# Patient Record
Sex: MALE | Race: WHITE | NOT HISPANIC OR LATINO | Employment: OTHER | URBAN - METROPOLITAN AREA
[De-identification: names, ages, dates, MRNs, and addresses within clinical notes are randomized per-mention and may not be internally consistent; named-entity substitution may affect disease eponyms.]

---

## 2017-12-26 ENCOUNTER — APPOINTMENT (OUTPATIENT)
Dept: ADMISSIONS | Facility: MEDICAL CENTER | Age: 67
DRG: 330 | End: 2017-12-26
Attending: SURGERY
Payer: MEDICARE

## 2017-12-26 RX ORDER — LISINOPRIL 40 MG/1
40 TABLET ORAL DAILY
Status: ON HOLD | COMMUNITY
End: 2018-01-16

## 2017-12-26 RX ORDER — TRIAMTERENE AND HYDROCHLOROTHIAZIDE 37.5; 25 MG/1; MG/1
1 CAPSULE ORAL EVERY MORNING
Status: ON HOLD | COMMUNITY
End: 2019-02-08

## 2017-12-26 RX ORDER — CITALOPRAM 40 MG/1
40 TABLET ORAL DAILY
Status: ON HOLD | COMMUNITY
End: 2018-01-16

## 2018-01-08 DIAGNOSIS — Z01.812 PRE-OPERATIVE LABORATORY EXAMINATION: ICD-10-CM

## 2018-01-08 DIAGNOSIS — Z01.810 PRE-OPERATIVE CARDIOVASCULAR EXAMINATION: ICD-10-CM

## 2018-01-08 LAB
ANION GAP SERPL CALC-SCNC: 8 MMOL/L (ref 0–11.9)
BUN SERPL-MCNC: 28 MG/DL (ref 8–22)
CALCIUM SERPL-MCNC: 9.6 MG/DL (ref 8.5–10.5)
CHLORIDE SERPL-SCNC: 102 MMOL/L (ref 96–112)
CO2 SERPL-SCNC: 22 MMOL/L (ref 20–33)
CREAT SERPL-MCNC: 1.48 MG/DL (ref 0.5–1.4)
EKG IMPRESSION: NORMAL
ERYTHROCYTE [DISTWIDTH] IN BLOOD BY AUTOMATED COUNT: 48 FL (ref 35.9–50)
GLUCOSE SERPL-MCNC: 111 MG/DL (ref 65–99)
HCT VFR BLD AUTO: 46.4 % (ref 42–52)
HGB BLD-MCNC: 15.6 G/DL (ref 14–18)
MCH RBC QN AUTO: 30.3 PG (ref 27–33)
MCHC RBC AUTO-ENTMCNC: 33.6 G/DL (ref 33.7–35.3)
MCV RBC AUTO: 90.1 FL (ref 81.4–97.8)
PLATELET # BLD AUTO: 292 K/UL (ref 164–446)
PMV BLD AUTO: 8.6 FL (ref 9–12.9)
POTASSIUM SERPL-SCNC: 4.3 MMOL/L (ref 3.6–5.5)
RBC # BLD AUTO: 5.15 M/UL (ref 4.7–6.1)
SODIUM SERPL-SCNC: 132 MMOL/L (ref 135–145)
WBC # BLD AUTO: 7.6 K/UL (ref 4.8–10.8)

## 2018-01-08 PROCEDURE — 36415 COLL VENOUS BLD VENIPUNCTURE: CPT

## 2018-01-08 PROCEDURE — 93010 ELECTROCARDIOGRAM REPORT: CPT | Performed by: INTERNAL MEDICINE

## 2018-01-08 PROCEDURE — 93005 ELECTROCARDIOGRAM TRACING: CPT

## 2018-01-08 PROCEDURE — 85027 COMPLETE CBC AUTOMATED: CPT

## 2018-01-08 PROCEDURE — 80048 BASIC METABOLIC PNL TOTAL CA: CPT

## 2018-01-08 RX ORDER — CELECOXIB 200 MG/1
400 CAPSULE ORAL
Status: COMPLETED | OUTPATIENT
Start: 2018-01-09 | End: 2018-01-09

## 2018-01-08 RX ORDER — ACETAMINOPHEN 500 MG
1000 TABLET ORAL
Status: COMPLETED | OUTPATIENT
Start: 2018-01-09 | End: 2018-01-09

## 2018-01-08 RX ORDER — GABAPENTIN 300 MG/1
300 CAPSULE ORAL
Status: COMPLETED | OUTPATIENT
Start: 2018-01-09 | End: 2018-01-09

## 2018-01-09 ENCOUNTER — HOSPITAL ENCOUNTER (INPATIENT)
Facility: MEDICAL CENTER | Age: 68
LOS: 7 days | DRG: 330 | End: 2018-01-16
Attending: SURGERY | Admitting: SURGERY
Payer: MEDICARE

## 2018-01-09 DIAGNOSIS — K59.1 FUNCTIONAL DIARRHEA: ICD-10-CM

## 2018-01-09 DIAGNOSIS — R10.9 ACUTE POSTOPERATIVE PAIN OF ABDOMEN: ICD-10-CM

## 2018-01-09 DIAGNOSIS — G89.18 ACUTE POSTOPERATIVE PAIN OF ABDOMEN: ICD-10-CM

## 2018-01-09 LAB
GRAM STN SPEC: NORMAL
SIGNIFICANT IND 70042: NORMAL
SITE SITE: NORMAL
SOURCE SOURCE: NORMAL

## 2018-01-09 PROCEDURE — 501446: Performed by: SURGERY

## 2018-01-09 PROCEDURE — 160042 HCHG SURGERY MINUTES - EA ADDL 1 MIN LEVEL 5: Performed by: SURGERY

## 2018-01-09 PROCEDURE — 501428 HCHG STAPLER, CURVED: Performed by: SURGERY

## 2018-01-09 PROCEDURE — 501433 HCHG STAPLER, GIA MULTIFIRE 60/80: Performed by: SURGERY

## 2018-01-09 PROCEDURE — 0DTN4ZZ RESECTION OF SIGMOID COLON, PERCUTANEOUS ENDOSCOPIC APPROACH: ICD-10-PCS | Performed by: SURGERY

## 2018-01-09 PROCEDURE — C1765 ADHESION BARRIER: HCPCS | Performed by: SURGERY

## 2018-01-09 PROCEDURE — 0DBB4ZZ EXCISION OF ILEUM, PERCUTANEOUS ENDOSCOPIC APPROACH: ICD-10-PCS | Performed by: SURGERY

## 2018-01-09 PROCEDURE — 88305 TISSUE EXAM BY PATHOLOGIST: CPT

## 2018-01-09 PROCEDURE — 160048 HCHG OR STATISTICAL LEVEL 1-5: Performed by: SURGERY

## 2018-01-09 PROCEDURE — 0D1B4Z4 BYPASS ILEUM TO CUTANEOUS, PERCUTANEOUS ENDOSCOPIC APPROACH: ICD-10-PCS | Performed by: SURGERY

## 2018-01-09 PROCEDURE — 160036 HCHG PACU - EA ADDL 30 MINS PHASE I: Performed by: SURGERY

## 2018-01-09 PROCEDURE — 87186 SC STD MICRODIL/AGAR DIL: CPT

## 2018-01-09 PROCEDURE — 700111 HCHG RX REV CODE 636 W/ 250 OVERRIDE (IP): Performed by: ANESTHESIOLOGY

## 2018-01-09 PROCEDURE — 160009 HCHG ANES TIME/MIN: Performed by: SURGERY

## 2018-01-09 PROCEDURE — 88307 TISSUE EXAM BY PATHOLOGIST: CPT

## 2018-01-09 PROCEDURE — 500444 HCHG HEMOSTAT, SURGICEL 2X3: Performed by: SURGERY

## 2018-01-09 PROCEDURE — 0DTM4ZZ RESECTION OF DESCENDING COLON, PERCUTANEOUS ENDOSCOPIC APPROACH: ICD-10-PCS | Performed by: SURGERY

## 2018-01-09 PROCEDURE — A9270 NON-COVERED ITEM OR SERVICE: HCPCS | Performed by: NURSE PRACTITIONER

## 2018-01-09 PROCEDURE — 0DTU4ZZ RESECTION OF OMENTUM, PERCUTANEOUS ENDOSCOPIC APPROACH: ICD-10-PCS | Performed by: SURGERY

## 2018-01-09 PROCEDURE — 501570 HCHG TROCAR, SEPARATOR: Performed by: SURGERY

## 2018-01-09 PROCEDURE — 501838 HCHG SUTURE GENERAL: Performed by: SURGERY

## 2018-01-09 PROCEDURE — 700111 HCHG RX REV CODE 636 W/ 250 OVERRIDE (IP)

## 2018-01-09 PROCEDURE — 700102 HCHG RX REV CODE 250 W/ 637 OVERRIDE(OP)

## 2018-01-09 PROCEDURE — 87077 CULTURE AEROBIC IDENTIFY: CPT

## 2018-01-09 PROCEDURE — 0DTL4ZZ RESECTION OF TRANSVERSE COLON, PERCUTANEOUS ENDOSCOPIC APPROACH: ICD-10-PCS | Performed by: SURGERY

## 2018-01-09 PROCEDURE — 160035 HCHG PACU - 1ST 60 MINS PHASE I: Performed by: SURGERY

## 2018-01-09 PROCEDURE — 700102 HCHG RX REV CODE 250 W/ 637 OVERRIDE(OP): Performed by: SURGERY

## 2018-01-09 PROCEDURE — 700105 HCHG RX REV CODE 258: Performed by: SURGERY

## 2018-01-09 PROCEDURE — 500002 HCHG ADHESIVE, DERMABOND: Performed by: SURGERY

## 2018-01-09 PROCEDURE — A9270 NON-COVERED ITEM OR SERVICE: HCPCS | Performed by: SURGERY

## 2018-01-09 PROCEDURE — A9270 NON-COVERED ITEM OR SERVICE: HCPCS

## 2018-01-09 PROCEDURE — 0DTK4ZZ RESECTION OF ASCENDING COLON, PERCUTANEOUS ENDOSCOPIC APPROACH: ICD-10-PCS | Performed by: SURGERY

## 2018-01-09 PROCEDURE — 500122 HCHG BOVIE, BLADE: Performed by: SURGERY

## 2018-01-09 PROCEDURE — 501459: Performed by: SURGERY

## 2018-01-09 PROCEDURE — 500371 HCHG DRAIN, BLAKE 10MM: Performed by: SURGERY

## 2018-01-09 PROCEDURE — 501452 HCHG STAPLES, GIA MULTIFIRE 60/80: Performed by: SURGERY

## 2018-01-09 PROCEDURE — 501448 HCHG STAPLER, TA 45 4.8: Performed by: SURGERY

## 2018-01-09 PROCEDURE — P9045 ALBUMIN (HUMAN), 5%, 250 ML: HCPCS | Mod: JG

## 2018-01-09 PROCEDURE — 160002 HCHG RECOVERY MINUTES (STAT): Performed by: SURGERY

## 2018-01-09 PROCEDURE — 500594 HCHG GELPORT: Performed by: SURGERY

## 2018-01-09 PROCEDURE — 500389 HCHG DRAIN, RESERVOIR SUCT JP 100CC: Performed by: SURGERY

## 2018-01-09 PROCEDURE — 700101 HCHG RX REV CODE 250

## 2018-01-09 PROCEDURE — 700101 HCHG RX REV CODE 250: Performed by: NURSE PRACTITIONER

## 2018-01-09 PROCEDURE — 160031 HCHG SURGERY MINUTES - 1ST 30 MINS LEVEL 5: Performed by: SURGERY

## 2018-01-09 PROCEDURE — 8E0W4CZ ROBOTIC ASSISTED PROCEDURE OF TRUNK REGION, PERCUTANEOUS ENDOSCOPIC APPROACH: ICD-10-PCS | Performed by: SURGERY

## 2018-01-09 PROCEDURE — 502240 HCHG MISC OR SUPPLY RC 0272: Performed by: SURGERY

## 2018-01-09 PROCEDURE — 87075 CULTR BACTERIA EXCEPT BLOOD: CPT

## 2018-01-09 PROCEDURE — 87070 CULTURE OTHR SPECIMN AEROBIC: CPT

## 2018-01-09 PROCEDURE — 700111 HCHG RX REV CODE 636 W/ 250 OVERRIDE (IP): Performed by: NURSE PRACTITIONER

## 2018-01-09 PROCEDURE — 770006 HCHG ROOM/CARE - MED/SURG/GYN SEMI*

## 2018-01-09 PROCEDURE — A6402 STERILE GAUZE <= 16 SQ IN: HCPCS | Performed by: SURGERY

## 2018-01-09 PROCEDURE — 700102 HCHG RX REV CODE 250 W/ 637 OVERRIDE(OP): Performed by: NURSE PRACTITIONER

## 2018-01-09 PROCEDURE — A4314 CATH W/DRAINAGE 2-WAY LATEX: HCPCS | Performed by: SURGERY

## 2018-01-09 PROCEDURE — 700111 HCHG RX REV CODE 636 W/ 250 OVERRIDE (IP): Performed by: SURGERY

## 2018-01-09 PROCEDURE — 87205 SMEAR GRAM STAIN: CPT

## 2018-01-09 PROCEDURE — 700104 HCHG RX REV CODE 254

## 2018-01-09 RX ORDER — HYDROMORPHONE HYDROCHLORIDE 2 MG/ML
1 INJECTION, SOLUTION INTRAMUSCULAR; INTRAVENOUS; SUBCUTANEOUS
Status: DISCONTINUED | OUTPATIENT
Start: 2018-01-09 | End: 2018-01-16 | Stop reason: HOSPADM

## 2018-01-09 RX ORDER — IBUPROFEN 800 MG/1
800 TABLET ORAL
Status: COMPLETED | OUTPATIENT
Start: 2018-01-09 | End: 2018-01-14

## 2018-01-09 RX ORDER — CELECOXIB 200 MG/1
CAPSULE ORAL
Status: COMPLETED
Start: 2018-01-09 | End: 2018-01-09

## 2018-01-09 RX ORDER — CALCIUM CARBONATE 500 MG/1
500 TABLET, CHEWABLE ORAL
Status: DISCONTINUED | OUTPATIENT
Start: 2018-01-09 | End: 2018-01-16 | Stop reason: HOSPADM

## 2018-01-09 RX ORDER — ACETAMINOPHEN 500 MG
TABLET ORAL
Status: COMPLETED
Start: 2018-01-09 | End: 2018-01-09

## 2018-01-09 RX ORDER — OXYCODONE HYDROCHLORIDE 5 MG/1
5 TABLET ORAL EVERY 4 HOURS PRN
Status: DISCONTINUED | OUTPATIENT
Start: 2018-01-09 | End: 2018-01-16 | Stop reason: HOSPADM

## 2018-01-09 RX ORDER — MAGNESIUM SULFATE HEPTAHYDRATE 40 MG/ML
4 INJECTION, SOLUTION INTRAVENOUS ONCE
Status: DISPENSED | OUTPATIENT
Start: 2018-01-09 | End: 2018-01-10

## 2018-01-09 RX ORDER — GABAPENTIN 300 MG/1
CAPSULE ORAL
Status: COMPLETED
Start: 2018-01-09 | End: 2018-01-09

## 2018-01-09 RX ORDER — DEXAMETHASONE SODIUM PHOSPHATE 4 MG/ML
4 INJECTION, SOLUTION INTRA-ARTICULAR; INTRALESIONAL; INTRAMUSCULAR; INTRAVENOUS; SOFT TISSUE
Status: DISCONTINUED | OUTPATIENT
Start: 2018-01-09 | End: 2018-01-16 | Stop reason: HOSPADM

## 2018-01-09 RX ORDER — DIPHENHYDRAMINE HYDROCHLORIDE 50 MG/ML
25 INJECTION INTRAMUSCULAR; INTRAVENOUS EVERY 6 HOURS PRN
Status: DISCONTINUED | OUTPATIENT
Start: 2018-01-09 | End: 2018-01-10

## 2018-01-09 RX ORDER — MAGNESIUM SULFATE HEPTAHYDRATE 40 MG/ML
2 INJECTION, SOLUTION INTRAVENOUS ONCE
Status: DISPENSED | OUTPATIENT
Start: 2018-01-09 | End: 2018-01-10

## 2018-01-09 RX ORDER — HALOPERIDOL 5 MG/ML
1 INJECTION INTRAMUSCULAR EVERY 6 HOURS PRN
Status: DISCONTINUED | OUTPATIENT
Start: 2018-01-09 | End: 2018-01-16 | Stop reason: HOSPADM

## 2018-01-09 RX ORDER — DEXTROSE MONOHYDRATE, SODIUM CHLORIDE, AND POTASSIUM CHLORIDE 50; 1.49; 4.5 G/1000ML; G/1000ML; G/1000ML
INJECTION, SOLUTION INTRAVENOUS CONTINUOUS
Status: DISCONTINUED | OUTPATIENT
Start: 2018-01-09 | End: 2018-01-16 | Stop reason: HOSPADM

## 2018-01-09 RX ORDER — CITALOPRAM 40 MG/1
40 TABLET ORAL DAILY
Status: DISCONTINUED | OUTPATIENT
Start: 2018-01-09 | End: 2018-01-16 | Stop reason: HOSPADM

## 2018-01-09 RX ORDER — ONDANSETRON 2 MG/ML
4 INJECTION INTRAMUSCULAR; INTRAVENOUS EVERY 4 HOURS PRN
Status: DISCONTINUED | OUTPATIENT
Start: 2018-01-09 | End: 2018-01-16 | Stop reason: HOSPADM

## 2018-01-09 RX ORDER — SODIUM CHLORIDE, SODIUM LACTATE, POTASSIUM CHLORIDE, CALCIUM CHLORIDE 600; 310; 30; 20 MG/100ML; MG/100ML; MG/100ML; MG/100ML
INJECTION, SOLUTION INTRAVENOUS CONTINUOUS
Status: DISCONTINUED | OUTPATIENT
Start: 2018-01-09 | End: 2018-01-11

## 2018-01-09 RX ORDER — ACETAMINOPHEN 500 MG
1000 TABLET ORAL EVERY 6 HOURS
Status: DISPENSED | OUTPATIENT
Start: 2018-01-09 | End: 2018-01-14

## 2018-01-09 RX ORDER — SCOLOPAMINE TRANSDERMAL SYSTEM 1 MG/1
1 PATCH, EXTENDED RELEASE TRANSDERMAL
Status: DISCONTINUED | OUTPATIENT
Start: 2018-01-09 | End: 2018-01-16 | Stop reason: HOSPADM

## 2018-01-09 RX ORDER — OXYCODONE HYDROCHLORIDE 10 MG/1
10 TABLET ORAL EVERY 4 HOURS PRN
Status: DISCONTINUED | OUTPATIENT
Start: 2018-01-09 | End: 2018-01-16 | Stop reason: HOSPADM

## 2018-01-09 RX ORDER — OXYCODONE HYDROCHLORIDE 5 MG/1
5-10 TABLET ORAL EVERY 4 HOURS PRN
Status: DISCONTINUED | OUTPATIENT
Start: 2018-01-09 | End: 2018-01-09

## 2018-01-09 RX ADMIN — POTASSIUM CHLORIDE, DEXTROSE MONOHYDRATE AND SODIUM CHLORIDE: 150; 5; 450 INJECTION, SOLUTION INTRAVENOUS at 16:37

## 2018-01-09 RX ADMIN — CELECOXIB 400 MG: 200 CAPSULE ORAL at 06:41

## 2018-01-09 RX ADMIN — OXYCODONE HYDROCHLORIDE 5 MG: 5 TABLET ORAL at 16:42

## 2018-01-09 RX ADMIN — FENTANYL CITRATE 50 MCG: 50 INJECTION, SOLUTION INTRAMUSCULAR; INTRAVENOUS at 15:30

## 2018-01-09 RX ADMIN — TAZOBACTAM SODIUM AND PIPERACILLIN SODIUM 3.38 G: 375; 3 INJECTION, SOLUTION INTRAVENOUS at 14:30

## 2018-01-09 RX ADMIN — CITALOPRAM HYDROBROMIDE 40 MG: 40 TABLET ORAL at 16:38

## 2018-01-09 RX ADMIN — GABAPENTIN 300 MG: 300 CAPSULE ORAL at 06:41

## 2018-01-09 RX ADMIN — ACETAMINOPHEN 1000 MG: 500 TABLET ORAL at 16:39

## 2018-01-09 RX ADMIN — SODIUM CHLORIDE: 9 INJECTION, SOLUTION INTRAVENOUS at 16:28

## 2018-01-09 RX ADMIN — ACETAMINOPHEN 1000 MG: 500 TABLET, FILM COATED ORAL at 06:40

## 2018-01-09 RX ADMIN — IBUPROFEN 800 MG: 800 TABLET, FILM COATED ORAL at 16:39

## 2018-01-09 RX ADMIN — OXYCODONE HYDROCHLORIDE 5 MG: 5 TABLET ORAL at 22:05

## 2018-01-09 RX ADMIN — Medication 1000 MG: at 06:40

## 2018-01-09 RX ADMIN — SODIUM CHLORIDE, SODIUM LACTATE, POTASSIUM CHLORIDE, CALCIUM CHLORIDE: 600; 310; 30; 20 INJECTION, SOLUTION INTRAVENOUS at 07:02

## 2018-01-09 ASSESSMENT — LIFESTYLE VARIABLES
ALCOHOL_USE: NO
EVER_SMOKED: NEVER

## 2018-01-09 ASSESSMENT — PAIN SCALES - GENERAL
PAINLEVEL_OUTOF10: 0
PAINLEVEL_OUTOF10: 5
PAINLEVEL_OUTOF10: 0
PAINLEVEL_OUTOF10: 6
PAINLEVEL_OUTOF10: 4
PAINLEVEL_OUTOF10: 0
PAINLEVEL_OUTOF10: 4

## 2018-01-10 LAB
ANION GAP SERPL CALC-SCNC: 6 MMOL/L (ref 0–11.9)
BASOPHILS # BLD AUTO: 0.1 % (ref 0–1.8)
BASOPHILS # BLD: 0.01 K/UL (ref 0–0.12)
BUN SERPL-MCNC: 28 MG/DL (ref 8–22)
CALCIUM SERPL-MCNC: 8.1 MG/DL (ref 8.5–10.5)
CHLORIDE SERPL-SCNC: 106 MMOL/L (ref 96–112)
CO2 SERPL-SCNC: 21 MMOL/L (ref 20–33)
CREAT SERPL-MCNC: 1.64 MG/DL (ref 0.5–1.4)
EOSINOPHIL # BLD AUTO: 0.01 K/UL (ref 0–0.51)
EOSINOPHIL NFR BLD: 0.1 % (ref 0–6.9)
ERYTHROCYTE [DISTWIDTH] IN BLOOD BY AUTOMATED COUNT: 46.5 FL (ref 35.9–50)
GLUCOSE SERPL-MCNC: 155 MG/DL (ref 65–99)
HCT VFR BLD AUTO: 33.8 % (ref 42–52)
HGB BLD-MCNC: 11.6 G/DL (ref 14–18)
IMM GRANULOCYTES # BLD AUTO: 0.04 K/UL (ref 0–0.11)
IMM GRANULOCYTES NFR BLD AUTO: 0.5 % (ref 0–0.9)
LYMPHOCYTES # BLD AUTO: 0.51 K/UL (ref 1–4.8)
LYMPHOCYTES NFR BLD: 6.1 % (ref 22–41)
MCH RBC QN AUTO: 30.7 PG (ref 27–33)
MCHC RBC AUTO-ENTMCNC: 34.3 G/DL (ref 33.7–35.3)
MCV RBC AUTO: 89.4 FL (ref 81.4–97.8)
MONOCYTES # BLD AUTO: 0.71 K/UL (ref 0–0.85)
MONOCYTES NFR BLD AUTO: 8.5 % (ref 0–13.4)
NEUTROPHILS # BLD AUTO: 7.06 K/UL (ref 1.82–7.42)
NEUTROPHILS NFR BLD: 84.7 % (ref 44–72)
NRBC # BLD AUTO: 0 K/UL
NRBC BLD-RTO: 0 /100 WBC
PLATELET # BLD AUTO: 213 K/UL (ref 164–446)
PMV BLD AUTO: 8.4 FL (ref 9–12.9)
POTASSIUM SERPL-SCNC: 4.4 MMOL/L (ref 3.6–5.5)
RBC # BLD AUTO: 3.78 M/UL (ref 4.7–6.1)
SODIUM SERPL-SCNC: 133 MMOL/L (ref 135–145)
WBC # BLD AUTO: 8.3 K/UL (ref 4.8–10.8)

## 2018-01-10 PROCEDURE — A9270 NON-COVERED ITEM OR SERVICE: HCPCS | Performed by: NURSE PRACTITIONER

## 2018-01-10 PROCEDURE — 700105 HCHG RX REV CODE 258: Performed by: SURGERY

## 2018-01-10 PROCEDURE — 700101 HCHG RX REV CODE 250: Performed by: NURSE PRACTITIONER

## 2018-01-10 PROCEDURE — 85025 COMPLETE CBC W/AUTO DIFF WBC: CPT

## 2018-01-10 PROCEDURE — 700102 HCHG RX REV CODE 250 W/ 637 OVERRIDE(OP): Performed by: SURGERY

## 2018-01-10 PROCEDURE — 302106 OSTOMY POWDER: Performed by: SURGERY

## 2018-01-10 PROCEDURE — A9270 NON-COVERED ITEM OR SERVICE: HCPCS | Performed by: SURGERY

## 2018-01-10 PROCEDURE — 302111 WAFER OST 2.25IN N IMG RD 2 PC (BARRIER): Performed by: SURGERY

## 2018-01-10 PROCEDURE — 80048 BASIC METABOLIC PNL TOTAL CA: CPT

## 2018-01-10 PROCEDURE — 700102 HCHG RX REV CODE 250 W/ 637 OVERRIDE(OP): Performed by: NURSE PRACTITIONER

## 2018-01-10 PROCEDURE — 36415 COLL VENOUS BLD VENIPUNCTURE: CPT

## 2018-01-10 PROCEDURE — 51798 US URINE CAPACITY MEASURE: CPT

## 2018-01-10 PROCEDURE — 770006 HCHG ROOM/CARE - MED/SURG/GYN SEMI*

## 2018-01-10 PROCEDURE — 302098 PASTE RING (FLAT): Performed by: SURGERY

## 2018-01-10 PROCEDURE — 700111 HCHG RX REV CODE 636 W/ 250 OVERRIDE (IP): Performed by: NURSE PRACTITIONER

## 2018-01-10 PROCEDURE — 700111 HCHG RX REV CODE 636 W/ 250 OVERRIDE (IP): Performed by: SURGERY

## 2018-01-10 RX ORDER — ALPRAZOLAM 0.25 MG/1
0.25 TABLET ORAL NIGHTLY PRN
Status: DISCONTINUED | OUTPATIENT
Start: 2018-01-10 | End: 2018-01-16 | Stop reason: HOSPADM

## 2018-01-10 RX ORDER — DIPHENHYDRAMINE HYDROCHLORIDE 50 MG/ML
25-50 INJECTION INTRAMUSCULAR; INTRAVENOUS EVERY 6 HOURS PRN
Status: DISCONTINUED | OUTPATIENT
Start: 2018-01-10 | End: 2018-01-16 | Stop reason: HOSPADM

## 2018-01-10 RX ADMIN — SODIUM CHLORIDE: 9 INJECTION, SOLUTION INTRAVENOUS at 18:41

## 2018-01-10 RX ADMIN — ACETAMINOPHEN 1000 MG: 500 TABLET ORAL at 11:55

## 2018-01-10 RX ADMIN — POTASSIUM CHLORIDE, DEXTROSE MONOHYDRATE AND SODIUM CHLORIDE: 150; 5; 450 INJECTION, SOLUTION INTRAVENOUS at 05:39

## 2018-01-10 RX ADMIN — ACETAMINOPHEN 1000 MG: 500 TABLET ORAL at 05:39

## 2018-01-10 RX ADMIN — IBUPROFEN 800 MG: 800 TABLET, FILM COATED ORAL at 11:30

## 2018-01-10 RX ADMIN — IBUPROFEN 800 MG: 800 TABLET, FILM COATED ORAL at 09:13

## 2018-01-10 RX ADMIN — IBUPROFEN 800 MG: 800 TABLET, FILM COATED ORAL at 17:42

## 2018-01-10 RX ADMIN — POTASSIUM CHLORIDE, DEXTROSE MONOHYDRATE AND SODIUM CHLORIDE: 150; 5; 450 INJECTION, SOLUTION INTRAVENOUS at 13:24

## 2018-01-10 RX ADMIN — CITALOPRAM HYDROBROMIDE 40 MG: 40 TABLET ORAL at 09:13

## 2018-01-10 RX ADMIN — ACETAMINOPHEN 1000 MG: 500 TABLET ORAL at 17:42

## 2018-01-10 RX ADMIN — POTASSIUM CHLORIDE, DEXTROSE MONOHYDRATE AND SODIUM CHLORIDE: 150; 5; 450 INJECTION, SOLUTION INTRAVENOUS at 22:10

## 2018-01-10 RX ADMIN — OXYCODONE HYDROCHLORIDE 5 MG: 5 TABLET ORAL at 20:49

## 2018-01-10 RX ADMIN — ALPRAZOLAM 0.25 MG: 0.25 TABLET ORAL at 22:10

## 2018-01-10 RX ADMIN — ENOXAPARIN SODIUM 40 MG: 100 INJECTION SUBCUTANEOUS at 09:13

## 2018-01-10 ASSESSMENT — ENCOUNTER SYMPTOMS
RESPIRATORY NEGATIVE: 1
FEVER: 0
CONSTITUTIONAL NEGATIVE: 1
NAUSEA: 0
VOMITING: 0
SHORTNESS OF BREATH: 0
CHILLS: 0
CARDIOVASCULAR NEGATIVE: 1

## 2018-01-10 ASSESSMENT — PAIN SCALES - GENERAL
PAINLEVEL_OUTOF10: 5
PAINLEVEL_OUTOF10: 2

## 2018-01-10 NOTE — PROGRESS NOTES
Surgical Progress Note    Author: Leonor Orellana Date & Time created: 1/10/2018   9:21 AM     Interval Events:  POD#1 S/P total colectomy, ileostomy and omentectomy.  Doing well.  Neg N/V.  Pain controlled.  Denies chest pain or SOB.  Ambulating.  CARISSA with serous drainage.     Review of Systems   Constitutional: Negative.  Negative for chills and fever.   Respiratory: Negative.  Negative for shortness of breath.    Cardiovascular: Negative.  Negative for chest pain.   Gastrointestinal: Negative for nausea and vomiting.   Genitourinary: Negative for dysuria.     Hemodynamics:  Temp (24hrs), Av.8 °C (98.3 °F), Min:36.5 °C (97.7 °F), Max:37.1 °C (98.8 °F)  Temperature: 36.8 °C (98.3 °F)  Pulse  Av.1  Min: 80  Max: 105Heart Rate (Monitored): 82  Blood Pressure : 107/69, NIBP: 116/61     Respiratory:    Respiration: 18, Pulse Oximetry: 94 %        RUL Breath Sounds: (P) Clear, RML Breath Sounds: (P) Diminished, RLL Breath Sounds: (P) Diminished, LILIA Breath Sounds: (P) Clear, LLL Breath Sounds: (P) Diminished  Neuro:  GCS       Fluids:    Intake/Output Summary (Last 24 hours) at 01/10/18 0921  Last data filed at 01/10/18 0850   Gross per 24 hour   Intake          8976.64 ml   Output             3550 ml   Net          5426.64 ml        Current Diet Order   Procedures   • Diet Order     Physical Exam   Constitutional: He is oriented to person, place, and time. He appears well-developed and well-nourished.   Cardiovascular: Normal rate and regular rhythm.    Pulmonary/Chest: Effort normal. No respiratory distress.   Abdominal: He exhibits distension.   incision CDI,  Mild tympany with palpation    Stoma pink and viable with flatus, no stool    CARISSA with serous drainage   Musculoskeletal: Normal range of motion.   Neurological: He is alert and oriented to person, place, and time.   Skin: Skin is warm and dry.     Labs:  Recent Results (from the past 24 hour(s))   CBC with Differential    Collection Time: 01/10/18   4:35 AM   Result Value Ref Range    WBC 8.3 4.8 - 10.8 K/uL    RBC 3.78 (L) 4.70 - 6.10 M/uL    Hemoglobin 11.6 (L) 14.0 - 18.0 g/dL    Hematocrit 33.8 (L) 42.0 - 52.0 %    MCV 89.4 81.4 - 97.8 fL    MCH 30.7 27.0 - 33.0 pg    MCHC 34.3 33.7 - 35.3 g/dL    RDW 46.5 35.9 - 50.0 fL    Platelet Count 213 164 - 446 K/uL    MPV 8.4 (L) 9.0 - 12.9 fL    Neutrophils-Polys 84.70 (H) 44.00 - 72.00 %    Lymphocytes 6.10 (L) 22.00 - 41.00 %    Monocytes 8.50 0.00 - 13.40 %    Eosinophils 0.10 0.00 - 6.90 %    Basophils 0.10 0.00 - 1.80 %    Immature Granulocytes 0.50 0.00 - 0.90 %    Nucleated RBC 0.00 /100 WBC    Neutrophils (Absolute) 7.06 1.82 - 7.42 K/uL    Lymphs (Absolute) 0.51 (L) 1.00 - 4.80 K/uL    Monos (Absolute) 0.71 0.00 - 0.85 K/uL    Eos (Absolute) 0.01 0.00 - 0.51 K/uL    Baso (Absolute) 0.01 0.00 - 0.12 K/uL    Immature Granulocytes (abs) 0.04 0.00 - 0.11 K/uL    NRBC (Absolute) 0.00 K/uL   Basic Metabolic Panel (BPM)    Collection Time: 01/10/18  4:35 AM   Result Value Ref Range    Sodium 133 (L) 135 - 145 mmol/L    Potassium 4.4 3.6 - 5.5 mmol/L    Chloride 106 96 - 112 mmol/L    Co2 21 20 - 33 mmol/L    Glucose 155 (H) 65 - 99 mg/dL    Bun 28 (H) 8 - 22 mg/dL    Creatinine 1.64 (H) 0.50 - 1.40 mg/dL    Calcium 8.1 (L) 8.5 - 10.5 mg/dL    Anion Gap 6.0 0.0 - 11.9   ESTIMATED GFR    Collection Time: 01/10/18  4:35 AM   Result Value Ref Range    GFR If  51 (A) >60 mL/min/1.73 m 2    GFR If Non  42 (A) >60 mL/min/1.73 m 2     Medical Decision Making, by Problem:  There are no active hospital problems to display for this patient.    Plan:  - Cont clear liquid diet and IV fluids, awaiting return of GI function  - IS Q One hour while awake  - Ambulate.  Out of bed for all meals please  - Pain controlled.  Cont PO pain medication  - Labs noted, recheck in am  - Zosyn, cont due to pelvic abcess for colonic perforation X 4 days  - DVT propholaxis, ok for Lovenox, sequentials and  ambulate  - Adequate urine output.  Cont, to monitor  - CARISSA with serous drainage, cont  - Ostomy nurse to see pt and start ostomy teaching  - IF continues to do well and tolerates PO, will plan for D/C when GI function returns and pt is comfortable with ostomy care.    Quality Measures:    Reviewed items::  Medications reviewed and Labs reviewed  Hoffmann catheter::  No Hoffmann  DVT prophylaxis pharmacological::  Contraindicated - Anemia requiring blood transfusion  DVT prophylaxis - mechanical:  SCDs  Ulcer Prophylaxis::  Not indicated      Discussed patient condition with Family, RN and Patient

## 2018-01-10 NOTE — PROGRESS NOTES
Pt A&O x4. Wife present at bedside.    Vitals: /66   Pulse 83   Temp 37 °C (98.6 °F)   Resp 17   Ht 1.829 m (6')   Wt 107.1 kg (236 lb 1.8 oz)   SpO2 91%   BMI 32.02 kg/m²     Pt rates pain 4 out of 10. Declines pain interventions at this time.    Neuro: OLIVEIRA. Denies new onset of numbness/ tingling.    Cardiac: Denies new onset of chest pain.    Vascular: Pulses 2+ BUE, BLE. No edema noted.    Respiratory: Lungs sound diminished in bases. Pulling 1250 on IS, effective, painful. On 1L O2 NC.  on, satting in high 90's. Denies SOB.  at night, ready at bedside.    GI: Abdomen semi-firm, tender, rounded, obese. + bowel sounds, - flatus, + output into ostomy appliance, water, black/ red. On clear liquid diet, tolerating well. - nausea/ vomiting.    : Hoffmann catheter secured in place, gravity drainage, adequate output, care provided, urine yellow/ clear.     MSK: Pt adjusts self frequently in bed. Pt reports soreness around abdominal area when maneuvering around in bed.     Integumentary: Midline abdominal incision CDI, approximated, Dermabond, DI. Left abdominal CARISSA drain in place, patent, dressing CDI, small/ serosanguinous output. RLQ ostomy in place, appliance intact, no drainage noted around appliance.     Labs noted.    Fall precautions in place: Bed locked in lowest position, Upper bed rails up, treaded socks in place, personal belongings within reach, call light within reach, appropriate mobility signs in place, - bed alarm. Pt calls appropriately.     Pt updated on POC.

## 2018-01-10 NOTE — PROGRESS NOTES
Received report from PAC-U.  Pt arrived to T407 bed 1  No immediate distress.  A&Ox4  Denies n/v  Abd CARISSA in place draining moderate amount of SS  Hoffmann to DD  Ostomy draining small amount of sanguinous output  Midline abd inc with dermabond  Oriented to room, white board, and call light.  Call light and personal belongings within reach.  Fall precautions and hourly rounding in place  Wife at bedside.

## 2018-01-10 NOTE — OR SURGEON
Immediate Post OP Note    PreOp Diagnosis:    diverticular abscess    PostOp Diagnosis:    diverticular abscess with arterial insufficiency for colorectal anastomosis    Procedure(s):  robotic assisted total abdominal colectomy with end ileostomy, omentectomy, abdominal cavity abscess washout, flexible sigmoidoscopy - Wound Class: Dirty or Infected    Surgeon(s):  Nghia Geiger M.D.    Anesthesiologist/Type of Anesthesia:  Anesthesiologist: Kaylene Bojorquez M.D./General    Surgical Staff:  Assistant: JOSE ANGEL Pitt  Circulator: Cassie Nina R.N.  Relief Circulator: Yumi Horta R.N.  Relief Scrub: Eunice Caruso  Scrub Person: Janice Jones Sunol: Maikol Garnica R.N.    Specimens:   terminal ileum and entire colon in segments    Estimated Blood Loss: 800 mL    Findings:  Very long mesenteric abscess involving the mesentery of the entire descending and sigmoid colon  With arterial insufficiency for colorectal anastomosis.  Total abdominal colectomy was performed with an end ileostomy and Bobby's pouch    Complications: none        1/9/2018 9:59 PM Nghia Geiger

## 2018-01-10 NOTE — CARE PLAN
Problem: Safety  Goal: Will remain free from falls  Outcome: PROGRESSING AS EXPECTED    Intervention: Assess risk factors for falls   01/09/18 2000   OTHER   Fall Risk High Risk to Fall - 2 or more points    Risk for Injury-Any positive answers results in the pt being at high risk for fall related injury Surgery: Thoracic or Abdominal Surgery or Lower Limb Amputation   Mobility Status Assessment 1-1 Healthcare Provider Required for Assistance with Ambulation & Transfer   History of fall 0   Pt Calls for Assistance Yes     Intervention: Implement fall precautions   01/09/18 2000   OTHER   Environmental Precautions Treaded Slipper Socks on Patient;Personal Belongings, Wastebasket, Call Bell etc. in Easy Reach;Report Given to Other Health Care Providers Regarding Fall Risk;Bed in Low Position;Communication Sign for Patients & Families;Mobility Assessed & Appropriate Sign Placed   IV Pole on Same Side of Bed as Bathroom Yes   Bedrails Bedrails Closest to Bathroom Down         Problem: Venous Thromboembolism (VTW)/Deep Vein Thrombosis (DVT) Prevention:  Goal: Patient will participate in Venous Thrombosis (VTE)/Deep Vein Thrombosis (DVT)Prevention Measures  Outcome: PROGRESSING AS EXPECTED   01/09/18 2000   Mechanical/VTE Prophylaxis   Mechanical Prophylaxis  SCDs, Sequential Compression Device   SCDs, Sequential Compression Device On   OTHER   Risk Assessment Score 6   VTE RISK Very High   Pharmacologic Prophylaxis Used LMWH: Enoxaparin(Lovenox)

## 2018-01-11 LAB
ANION GAP SERPL CALC-SCNC: 5 MMOL/L (ref 0–11.9)
BACTERIA WND AEROBE CULT: ABNORMAL
BACTERIA WND AEROBE CULT: ABNORMAL
BASOPHILS # BLD AUTO: 0.4 % (ref 0–1.8)
BASOPHILS # BLD: 0.04 K/UL (ref 0–0.12)
BUN SERPL-MCNC: 27 MG/DL (ref 8–22)
CALCIUM SERPL-MCNC: 7.5 MG/DL (ref 8.5–10.5)
CHLORIDE SERPL-SCNC: 103 MMOL/L (ref 96–112)
CO2 SERPL-SCNC: 22 MMOL/L (ref 20–33)
CREAT SERPL-MCNC: 1.55 MG/DL (ref 0.5–1.4)
EOSINOPHIL # BLD AUTO: 0.14 K/UL (ref 0–0.51)
EOSINOPHIL NFR BLD: 1.4 % (ref 0–6.9)
ERYTHROCYTE [DISTWIDTH] IN BLOOD BY AUTOMATED COUNT: 47.1 FL (ref 35.9–50)
GLUCOSE SERPL-MCNC: 118 MG/DL (ref 65–99)
GRAM STN SPEC: ABNORMAL
HCT VFR BLD AUTO: 29.2 % (ref 42–52)
HGB BLD-MCNC: 10.1 G/DL (ref 14–18)
IMM GRANULOCYTES # BLD AUTO: 0.08 K/UL (ref 0–0.11)
IMM GRANULOCYTES NFR BLD AUTO: 0.8 % (ref 0–0.9)
LYMPHOCYTES # BLD AUTO: 0.61 K/UL (ref 1–4.8)
LYMPHOCYTES NFR BLD: 6.2 % (ref 22–41)
MCH RBC QN AUTO: 30.9 PG (ref 27–33)
MCHC RBC AUTO-ENTMCNC: 34.6 G/DL (ref 33.7–35.3)
MCV RBC AUTO: 89.3 FL (ref 81.4–97.8)
MONOCYTES # BLD AUTO: 0.7 K/UL (ref 0–0.85)
MONOCYTES NFR BLD AUTO: 7.1 % (ref 0–13.4)
NEUTROPHILS # BLD AUTO: 8.24 K/UL (ref 1.82–7.42)
NEUTROPHILS NFR BLD: 84.1 % (ref 44–72)
NRBC # BLD AUTO: 0 K/UL
NRBC BLD-RTO: 0 /100 WBC
PLATELET # BLD AUTO: 196 K/UL (ref 164–446)
PMV BLD AUTO: 8.7 FL (ref 9–12.9)
POTASSIUM SERPL-SCNC: 4.1 MMOL/L (ref 3.6–5.5)
RBC # BLD AUTO: 3.27 M/UL (ref 4.7–6.1)
SIGNIFICANT IND 70042: ABNORMAL
SITE SITE: ABNORMAL
SODIUM SERPL-SCNC: 130 MMOL/L (ref 135–145)
SOURCE SOURCE: ABNORMAL
WBC # BLD AUTO: 9.8 K/UL (ref 4.8–10.8)

## 2018-01-11 PROCEDURE — 700111 HCHG RX REV CODE 636 W/ 250 OVERRIDE (IP): Performed by: NURSE PRACTITIONER

## 2018-01-11 PROCEDURE — 700105 HCHG RX REV CODE 258: Performed by: SURGERY

## 2018-01-11 PROCEDURE — 700101 HCHG RX REV CODE 250: Performed by: NURSE PRACTITIONER

## 2018-01-11 PROCEDURE — 700102 HCHG RX REV CODE 250 W/ 637 OVERRIDE(OP): Performed by: NURSE PRACTITIONER

## 2018-01-11 PROCEDURE — 36415 COLL VENOUS BLD VENIPUNCTURE: CPT

## 2018-01-11 PROCEDURE — 85025 COMPLETE CBC W/AUTO DIFF WBC: CPT

## 2018-01-11 PROCEDURE — 770006 HCHG ROOM/CARE - MED/SURG/GYN SEMI*

## 2018-01-11 PROCEDURE — 700111 HCHG RX REV CODE 636 W/ 250 OVERRIDE (IP): Performed by: SURGERY

## 2018-01-11 PROCEDURE — 80048 BASIC METABOLIC PNL TOTAL CA: CPT

## 2018-01-11 PROCEDURE — A9270 NON-COVERED ITEM OR SERVICE: HCPCS | Performed by: NURSE PRACTITIONER

## 2018-01-11 RX ADMIN — ACETAMINOPHEN 1000 MG: 500 TABLET ORAL at 12:52

## 2018-01-11 RX ADMIN — ACETAMINOPHEN 1000 MG: 500 TABLET ORAL at 18:02

## 2018-01-11 RX ADMIN — POTASSIUM CHLORIDE, DEXTROSE MONOHYDRATE AND SODIUM CHLORIDE: 150; 5; 450 INJECTION, SOLUTION INTRAVENOUS at 21:57

## 2018-01-11 RX ADMIN — ENOXAPARIN SODIUM 40 MG: 100 INJECTION SUBCUTANEOUS at 08:12

## 2018-01-11 RX ADMIN — POTASSIUM CHLORIDE, DEXTROSE MONOHYDRATE AND SODIUM CHLORIDE: 150; 5; 450 INJECTION, SOLUTION INTRAVENOUS at 08:11

## 2018-01-11 RX ADMIN — SODIUM CHLORIDE: 9 INJECTION, SOLUTION INTRAVENOUS at 18:36

## 2018-01-11 RX ADMIN — IBUPROFEN 800 MG: 800 TABLET, FILM COATED ORAL at 12:52

## 2018-01-11 RX ADMIN — CITALOPRAM HYDROBROMIDE 40 MG: 40 TABLET ORAL at 08:11

## 2018-01-11 RX ADMIN — DIPHENHYDRAMINE HYDROCHLORIDE 50 MG: 50 INJECTION, SOLUTION INTRAMUSCULAR; INTRAVENOUS at 21:57

## 2018-01-11 RX ADMIN — IBUPROFEN 800 MG: 800 TABLET, FILM COATED ORAL at 18:02

## 2018-01-11 RX ADMIN — IBUPROFEN 800 MG: 800 TABLET, FILM COATED ORAL at 08:11

## 2018-01-11 ASSESSMENT — ENCOUNTER SYMPTOMS
NAUSEA: 1
SHORTNESS OF BREATH: 0
RESPIRATORY NEGATIVE: 1
CARDIOVASCULAR NEGATIVE: 1
VOMITING: 0
FEVER: 0
CHILLS: 0
CONSTITUTIONAL NEGATIVE: 1

## 2018-01-11 ASSESSMENT — PAIN SCALES - GENERAL
PAINLEVEL_OUTOF10: 6
PAINLEVEL_OUTOF10: 1

## 2018-01-11 NOTE — WOUND TEAM
"[facility]  Inpatient Services  New Ostomy Management & Teaching    HPI:  Reviewed  PMH: Reviewed   SH: Reviewed    Subjective: \"I didn't know I was going to have this\"    Objective:  Appliance intact; supportive wife     Ostomy type:  ileostomy   Stoma location:  RLQ   Stoma assessment:    Appearance:  Dark red, edematous    Size:  1 1/2\"    Protrusion:  Well budded    Output: scant bloody serous    MC jxn   intact    Peristomal skin:  intact     Ostomy Appliance (type and size):  2 1/4\" two piece    Interventions:  Met with patient and wife.  Reviewed written information and Secure Start information completed.  They observed how to empty pouch and clean and close end of pouch.  Patient closed end of new pouch.  They observed removal of old appliance and cleansed peristomal skin.  Cut barrier to fit and applied to peristomal skin and snapped on pouch.  Wife and patient relieved at simplicity of appliance change as concerned \"it was going to be more of a deal\".  Discussed use of paste rings and plan to meet with wife and patient in AM for hands on opportunity.     Pt education: Questions and concerns addressed; see above    Evaluation:  Non functioning ileostomy; viable stoma; patient and wife seem motivated    Plan: Ostomy nurses to continue to follow for ostomy needs and teaching     Anticipated discharge needs: Supplies, supplier information, lives in [place] and shouldn't need outpatient follow up but informed ? [facility] in [place] that their PCP could refer them to if pouching issues.  "

## 2018-01-11 NOTE — PROGRESS NOTES
Surgical Progress Note    Author: Leonor Orellana      Interval Events:  POD#2 S/P total colectomy, ileostomy and omentectomy.  Doing well.  + Nausea, no vomit.  Feels very full.   Pain controlled.  Denies chest pain or SOB.  Ambulating.  CARISSA with serous drainage.     Review of Systems   Constitutional: Negative.  Negative for chills and fever.   Respiratory: Negative.  Negative for shortness of breath.    Cardiovascular: Negative.  Negative for chest pain.   Gastrointestinal: Positive for nausea. Negative for vomiting.   Genitourinary: Negative for dysuria.     Hemodynamics:  Temp (24hrs), Av.8 °C (98.2 °F), Min:36.7 °C (98 °F), Max:37 °C (98.6 °F)  Temperature: 36.7 °C (98 °F)  Pulse  Av.6  Min: 80  Max: 105   Blood Pressure : 119/75     Respiratory:    Respiration: 18, Pulse Oximetry: 96 %        RUL Breath Sounds: Clear, RML Breath Sounds: Diminished, RLL Breath Sounds: Diminished, LILIA Breath Sounds: Clear, LLL Breath Sounds: Diminished  Neuro:  GCS       Fluids:    Intake/Output Summary (Last 24 hours) at 01/10/18 0921  Last data filed at 01/10/18 0850   Gross per 24 hour   Intake          8976.64 ml   Output             3550 ml   Net          5426.64 ml        Current Diet Order   Procedures   • Diet Order     Physical Exam   Constitutional: He is oriented to person, place, and time. He appears well-developed and well-nourished.   Cardiovascular: Normal rate and regular rhythm.    Pulmonary/Chest: Effort normal. No respiratory distress.   Abdominal: He exhibits distension.   incision CDI,  Increased tympany with palpation    Stoma pink and viable with flatus, no stool    CARISSA with serous drainage   Musculoskeletal: Normal range of motion.   Neurological: He is alert and oriented to person, place, and time.   Skin: Skin is warm and dry.     Labs:  Recent Results (from the past 24 hour(s))   CBC with Differential    Collection Time: 18  2:46 AM   Result Value Ref Range    WBC 9.8 4.8 - 10.8 K/uL     RBC 3.27 (L) 4.70 - 6.10 M/uL    Hemoglobin 10.1 (L) 14.0 - 18.0 g/dL    Hematocrit 29.2 (L) 42.0 - 52.0 %    MCV 89.3 81.4 - 97.8 fL    MCH 30.9 27.0 - 33.0 pg    MCHC 34.6 33.7 - 35.3 g/dL    RDW 47.1 35.9 - 50.0 fL    Platelet Count 196 164 - 446 K/uL    MPV 8.7 (L) 9.0 - 12.9 fL    Neutrophils-Polys 84.10 (H) 44.00 - 72.00 %    Lymphocytes 6.20 (L) 22.00 - 41.00 %    Monocytes 7.10 0.00 - 13.40 %    Eosinophils 1.40 0.00 - 6.90 %    Basophils 0.40 0.00 - 1.80 %    Immature Granulocytes 0.80 0.00 - 0.90 %    Nucleated RBC 0.00 /100 WBC    Neutrophils (Absolute) 8.24 (H) 1.82 - 7.42 K/uL    Lymphs (Absolute) 0.61 (L) 1.00 - 4.80 K/uL    Monos (Absolute) 0.70 0.00 - 0.85 K/uL    Eos (Absolute) 0.14 0.00 - 0.51 K/uL    Baso (Absolute) 0.04 0.00 - 0.12 K/uL    Immature Granulocytes (abs) 0.08 0.00 - 0.11 K/uL    NRBC (Absolute) 0.00 K/uL   Basic Metabolic Panel (BPM)    Collection Time: 01/11/18  2:46 AM   Result Value Ref Range    Sodium 130 (L) 135 - 145 mmol/L    Potassium 4.1 3.6 - 5.5 mmol/L    Chloride 103 96 - 112 mmol/L    Co2 22 20 - 33 mmol/L    Glucose 118 (H) 65 - 99 mg/dL    Bun 27 (H) 8 - 22 mg/dL    Creatinine 1.55 (H) 0.50 - 1.40 mg/dL    Calcium 7.5 (L) 8.5 - 10.5 mg/dL    Anion Gap 5.0 0.0 - 11.9   ESTIMATED GFR    Collection Time: 01/11/18  2:46 AM   Result Value Ref Range    GFR If  55 (A) >60 mL/min/1.73 m 2    GFR If Non African American 45 (A) >60 mL/min/1.73 m 2     Medical Decision Making, by Problem:  There are no active hospital problems to display for this patient.    Plan:  - NPO, if he starts to vomit will give NG tube. awaiting return of GI function  - IS Q One hour while awake  - Ambulate.  Out of bed for all meals please  - Pain controlled.  Cont PO pain medication  - Labs noted, recheck in am  - Zosyn, cont due to pelvic abcess for colonic perforation X 4 days  - DVT propholaxis, ok for Lovenox, sequentials and ambulate  - Adequate urine output.  Cont, to monitor  -  CARISSA with serous drainage, cont  - Ostomy nurse to see pt and start ostomy teaching  - will plan for D/C when GI function returns and pt is comfortable with ostomy care.    Quality Measures:    Reviewed items::  Medications reviewed and Labs reviewed  Hoffmann catheter::  No Hoffmann  DVT prophylaxis pharmacological::  Contraindicated - Anemia requiring blood transfusion  DVT prophylaxis - mechanical:  SCDs  Ulcer Prophylaxis::  Not indicated      Discussed patient condition with Family, RN and Patient

## 2018-01-11 NOTE — PROGRESS NOTES
Patient has only been have to void 200 total plus a large incontinence this morning.  Bladder scan attempted, however, amounts varied around 65cc.  Patient states he has a feeling of fullness.  Patient straight cathed and only received 100 out of dionne clear urine.

## 2018-01-11 NOTE — CARE PLAN
Problem: Discharge Barriers/Planning  Goal: Patient's continuum of care needs will be met  Pt is running 24 hours IV abx.  CARISSA has large amounts of output.     Problem: Pain Management  Goal: Pain level will decrease to patient's comfort goal  Will medicate for pain PRN see MAR

## 2018-01-11 NOTE — PROGRESS NOTES
Assumed care of patient at 1915, received report from day RN at that time. Communication board updated and reviewed with pt. Assessment complete. Wife at bedside. No other needs at this time.

## 2018-01-11 NOTE — DOCUMENTATION QUERY
DOCUMENTATION QUERY    PROVIDERS: Please select “Cosign w/ note”to reply to query.    To better represent the severity of illness of your patient, please review the following information and exercise your independent professional judgment in responding to this query.     Na 130 is noted in the 1/11 Lab Results . Based upon the clinical findings, risk factors, and treatment, can a diagnosis be provided to support this finding?    • Hyponatremia  • Findings of no clinical significance   • Other explanation of clinical findings  • Unable to determine (no explanation for clinical findings)          The medical record reflects the following:   Clinical Findings  1/8 Na 132, 1/10 Na 133, 1/11 Na 130   Treatment  D5 1/2NS 20 mEq KCl   Risk Factors  s/p total colectomy, ileostomy, and omentectomy.  NPO   Location within medical record  Progress Notes, Lab Results  and MAR     Thank you,   Nandini Dang RN, BSN  Clinical   [phone]

## 2018-01-11 NOTE — PROGRESS NOTES
Received bedside report and assumed care of patient.  Assessment complete; discussed POC with patient.  AO x4, patient calls for assistance.  Patient appears calm and exhibits no signs of distress.  Patient reports pain of 5/10, medicating per MAR PRN.  Patient tolerating current diet.  BS hypoactive x 4, LBM per ostomy (small light brown output), patient voids with bedside urinal or ambulating to bathroom.  Patient is standby assist with IV pole, gait steady.  Call light within reach, bed in lowest position, SCDs refused, bed alarm refused.  Will continue to monitor pt for changes in status and provide care.

## 2018-01-12 LAB
ANION GAP SERPL CALC-SCNC: 4 MMOL/L (ref 0–11.9)
BACTERIA SPEC ANAEROBE CULT: ABNORMAL
BACTERIA SPEC ANAEROBE CULT: ABNORMAL
BASOPHILS # BLD AUTO: 0.8 % (ref 0–1.8)
BASOPHILS # BLD: 0.07 K/UL (ref 0–0.12)
BUN SERPL-MCNC: 21 MG/DL (ref 8–22)
CALCIUM SERPL-MCNC: 7.8 MG/DL (ref 8.5–10.5)
CHLORIDE SERPL-SCNC: 106 MMOL/L (ref 96–112)
CO2 SERPL-SCNC: 23 MMOL/L (ref 20–33)
CREAT SERPL-MCNC: 1.32 MG/DL (ref 0.5–1.4)
EOSINOPHIL # BLD AUTO: 0.24 K/UL (ref 0–0.51)
EOSINOPHIL NFR BLD: 2.6 % (ref 0–6.9)
ERYTHROCYTE [DISTWIDTH] IN BLOOD BY AUTOMATED COUNT: 48.6 FL (ref 35.9–50)
GLUCOSE SERPL-MCNC: 110 MG/DL (ref 65–99)
HCT VFR BLD AUTO: 29.6 % (ref 42–52)
HGB BLD-MCNC: 10.1 G/DL (ref 14–18)
IMM GRANULOCYTES # BLD AUTO: 0.08 K/UL (ref 0–0.11)
IMM GRANULOCYTES NFR BLD AUTO: 0.9 % (ref 0–0.9)
LYMPHOCYTES # BLD AUTO: 0.86 K/UL (ref 1–4.8)
LYMPHOCYTES NFR BLD: 9.3 % (ref 22–41)
MCH RBC QN AUTO: 30.6 PG (ref 27–33)
MCHC RBC AUTO-ENTMCNC: 34.1 G/DL (ref 33.7–35.3)
MCV RBC AUTO: 89.7 FL (ref 81.4–97.8)
MONOCYTES # BLD AUTO: 0.71 K/UL (ref 0–0.85)
MONOCYTES NFR BLD AUTO: 7.7 % (ref 0–13.4)
NEUTROPHILS # BLD AUTO: 7.27 K/UL (ref 1.82–7.42)
NEUTROPHILS NFR BLD: 78.7 % (ref 44–72)
NRBC # BLD AUTO: 0 K/UL
NRBC BLD-RTO: 0 /100 WBC
PLATELET # BLD AUTO: 217 K/UL (ref 164–446)
PMV BLD AUTO: 8.3 FL (ref 9–12.9)
POTASSIUM SERPL-SCNC: 4.6 MMOL/L (ref 3.6–5.5)
RBC # BLD AUTO: 3.3 M/UL (ref 4.7–6.1)
SIGNIFICANT IND 70042: ABNORMAL
SITE SITE: ABNORMAL
SODIUM SERPL-SCNC: 133 MMOL/L (ref 135–145)
SOURCE SOURCE: ABNORMAL
WBC # BLD AUTO: 9.2 K/UL (ref 4.8–10.8)

## 2018-01-12 PROCEDURE — 700111 HCHG RX REV CODE 636 W/ 250 OVERRIDE (IP): Performed by: SURGERY

## 2018-01-12 PROCEDURE — 700102 HCHG RX REV CODE 250 W/ 637 OVERRIDE(OP): Performed by: NURSE PRACTITIONER

## 2018-01-12 PROCEDURE — A9270 NON-COVERED ITEM OR SERVICE: HCPCS | Performed by: NURSE PRACTITIONER

## 2018-01-12 PROCEDURE — 700111 HCHG RX REV CODE 636 W/ 250 OVERRIDE (IP): Performed by: NURSE PRACTITIONER

## 2018-01-12 PROCEDURE — 36415 COLL VENOUS BLD VENIPUNCTURE: CPT

## 2018-01-12 PROCEDURE — 85025 COMPLETE CBC W/AUTO DIFF WBC: CPT

## 2018-01-12 PROCEDURE — 700101 HCHG RX REV CODE 250: Performed by: NURSE PRACTITIONER

## 2018-01-12 PROCEDURE — 700105 HCHG RX REV CODE 258: Performed by: SURGERY

## 2018-01-12 PROCEDURE — 80048 BASIC METABOLIC PNL TOTAL CA: CPT

## 2018-01-12 PROCEDURE — 770006 HCHG ROOM/CARE - MED/SURG/GYN SEMI*

## 2018-01-12 RX ADMIN — SODIUM CHLORIDE: 9 INJECTION, SOLUTION INTRAVENOUS at 18:40

## 2018-01-12 RX ADMIN — ENOXAPARIN SODIUM 40 MG: 100 INJECTION SUBCUTANEOUS at 09:31

## 2018-01-12 RX ADMIN — ALPRAZOLAM 0.25 MG: 0.25 TABLET ORAL at 00:34

## 2018-01-12 RX ADMIN — POTASSIUM CHLORIDE, DEXTROSE MONOHYDRATE AND SODIUM CHLORIDE: 150; 5; 450 INJECTION, SOLUTION INTRAVENOUS at 22:19

## 2018-01-12 RX ADMIN — ACETAMINOPHEN 1000 MG: 500 TABLET ORAL at 00:34

## 2018-01-12 RX ADMIN — POTASSIUM CHLORIDE, DEXTROSE MONOHYDRATE AND SODIUM CHLORIDE: 150; 5; 450 INJECTION, SOLUTION INTRAVENOUS at 09:50

## 2018-01-12 RX ADMIN — ACETAMINOPHEN 1000 MG: 500 TABLET ORAL at 18:40

## 2018-01-12 RX ADMIN — IBUPROFEN 800 MG: 800 TABLET, FILM COATED ORAL at 18:40

## 2018-01-12 RX ADMIN — CITALOPRAM HYDROBROMIDE 40 MG: 40 TABLET ORAL at 09:31

## 2018-01-12 RX ADMIN — IBUPROFEN 800 MG: 800 TABLET, FILM COATED ORAL at 09:31

## 2018-01-12 RX ADMIN — IBUPROFEN 800 MG: 800 TABLET, FILM COATED ORAL at 13:29

## 2018-01-12 RX ADMIN — ACETAMINOPHEN 1000 MG: 500 TABLET ORAL at 05:52

## 2018-01-12 RX ADMIN — DIPHENHYDRAMINE HYDROCHLORIDE 50 MG: 50 INJECTION, SOLUTION INTRAMUSCULAR; INTRAVENOUS at 22:18

## 2018-01-12 RX ADMIN — ACETAMINOPHEN 1000 MG: 500 TABLET ORAL at 13:29

## 2018-01-12 ASSESSMENT — ENCOUNTER SYMPTOMS
SHORTNESS OF BREATH: 0
CHILLS: 0
CARDIOVASCULAR NEGATIVE: 1
VOMITING: 0
NAUSEA: 0
RESPIRATORY NEGATIVE: 1
FEVER: 0
CONSTITUTIONAL NEGATIVE: 1

## 2018-01-12 ASSESSMENT — PAIN SCALES - GENERAL
PAINLEVEL_OUTOF10: 0
PAINLEVEL_OUTOF10: 1

## 2018-01-12 NOTE — DISCHARGE PLANNING
Sw met with pt and family bedside. Pt and family are aware they will learn ostomy care. Pt wife to drive pt home once medically clear. Pt lives in [place] likely can utilize outpatient wound care at the hospital there. SW to f/u on needs for this pt.

## 2018-01-12 NOTE — PROGRESS NOTES
Report received from day RN. Assumed care at 1915.  A/O x4. Calls appropriately before getting OOB. Wife at bedside to assist with mobility  Reports pain 1/10 out of 0-10 pain scale at this time. No pain medication needed at this time. Medicated with benadryl to assist with rest and assist with pain tolerance per pt request.  No c/o of N/V. NPO at this time, but can have sips with meds.  (+) flatus, (+) output through RLQ ostomy, greenish brown in color, hyperactive BS, (+) void  Noted: RLQ ostomy, midline incision open to air, LLQ CARISSA drain (suction bulb w/ serosang output)  Ambulates independently, SBA to bathroom.  SCD's off, vss  Reviewed POC for evening. All questions answered.   Call light within reach. Bed at lowest position w/ bed brakes locked. Hourly rounding in place.

## 2018-01-12 NOTE — WOUND TEAM
"[facility]  Inpatient Services  New Ostomy Management & Teaching    HPI:  Reviewed  PMH: Reviewed   SH: Reviewed    Subjective: \"I can't eat anything today\"    Objective:  Appliance intact; supportive wife; patient resigned to ostomy     Ostomy type:  ileostomy   Stoma location:  RLQ   Stoma assessment:    Appearance:  red, edematous    Size:  1 1/2\"    Protrusion:  Well budded    Output: scant brown serous (+) flatus    MC jxn   intact    Peristomal skin:  intact     Ostomy Appliance (type and size):  2 1/4\" two piece with paste ring    Interventions:  Met with patient and wife.  She cut barrier with direction and removed old appliance.  She cleansed peristomal skin and both observed crusting procedure.  They observed application of paste ring to barrier opening and she applied to peristomal skin and snapped on pouch closing end of pouch.  Coloplast two piece appliance reviewed and left with patient to try later.  Discussed with patient resources for ostomy support.  And he reports more accepting today.  Encouragement given.     Pt education: Questions and concerns addressed; see above    Evaluation:  (+) flatus and brown watery liquid in pouch.  Wife doing well with care and patient seems to be more accepting.    Plan: Ostomy nurses to continue to follow for ostomy needs and teaching     Anticipated discharge needs: Supplies, supplier information, lives in [place] and shouldn't need outpatient follow up but informed ? [facility] in [place] that their PCP could refer them to if pouching issues.  "

## 2018-01-12 NOTE — PROGRESS NOTES
Surgical Progress Note    Author: Leonor Orellana      Interval Events:  POD#3 S/P total colectomy, ileostomy and omentectomy.  Doing well.  no Nausea, no vomit.  Feeling of fullness improved. Ostomy functioning.   Pain controlled.  Denies chest pain or SOB.  Ambulating.  CARISSA with serous drainage.     Review of Systems   Constitutional: Negative.  Negative for chills and fever.   Respiratory: Negative.  Negative for shortness of breath.    Cardiovascular: Negative.  Negative for chest pain.   Gastrointestinal: Negative for nausea and vomiting.   Genitourinary: Negative for dysuria.     Hemodynamics:  Temp (24hrs), Av.9 °C (98.5 °F), Min:36.6 °C (97.9 °F), Max:37.5 °C (99.5 °F)  Temperature: 36.6 °C (97.9 °F)  Pulse  Av.7  Min: 60  Max: 105   Blood Pressure : 128/77     Respiratory:    Respiration: 18, Pulse Oximetry: 96 %        RUL Breath Sounds: Clear, RML Breath Sounds: Clear, RLL Breath Sounds: Diminished, LILIA Breath Sounds: Clear, LLL Breath Sounds: Diminished  Neuro:  GCS       Fluids:    Intake/Output Summary (Last 24 hours) at 01/10/18 0921  Last data filed at 01/10/18 0850   Gross per 24 hour   Intake          8976.64 ml   Output             3550 ml   Net          5426.64 ml        Current Diet Order   Procedures   • DIET NPO     Physical Exam   Constitutional: He is oriented to person, place, and time. He appears well-developed and well-nourished.   Cardiovascular: Normal rate and regular rhythm.    Pulmonary/Chest: Effort normal. No respiratory distress.   Abdominal: He exhibits distension.   incision CDI,  Increased tympany with palpation, but decreased distention today    Stoma pink and viable with +flatus and stool    CARISSA with serous drainage   Musculoskeletal: Normal range of motion.   Neurological: He is alert and oriented to person, place, and time.   Skin: Skin is warm and dry.     Labs:  Recent Results (from the past 24 hour(s))   CBC with Differential    Collection Time: 18  3:08  AM   Result Value Ref Range    WBC 9.2 4.8 - 10.8 K/uL    RBC 3.30 (L) 4.70 - 6.10 M/uL    Hemoglobin 10.1 (L) 14.0 - 18.0 g/dL    Hematocrit 29.6 (L) 42.0 - 52.0 %    MCV 89.7 81.4 - 97.8 fL    MCH 30.6 27.0 - 33.0 pg    MCHC 34.1 33.7 - 35.3 g/dL    RDW 48.6 35.9 - 50.0 fL    Platelet Count 217 164 - 446 K/uL    MPV 8.3 (L) 9.0 - 12.9 fL    Neutrophils-Polys 78.70 (H) 44.00 - 72.00 %    Lymphocytes 9.30 (L) 22.00 - 41.00 %    Monocytes 7.70 0.00 - 13.40 %    Eosinophils 2.60 0.00 - 6.90 %    Basophils 0.80 0.00 - 1.80 %    Immature Granulocytes 0.90 0.00 - 0.90 %    Nucleated RBC 0.00 /100 WBC    Neutrophils (Absolute) 7.27 1.82 - 7.42 K/uL    Lymphs (Absolute) 0.86 (L) 1.00 - 4.80 K/uL    Monos (Absolute) 0.71 0.00 - 0.85 K/uL    Eos (Absolute) 0.24 0.00 - 0.51 K/uL    Baso (Absolute) 0.07 0.00 - 0.12 K/uL    Immature Granulocytes (abs) 0.08 0.00 - 0.11 K/uL    NRBC (Absolute) 0.00 K/uL   Basic Metabolic Panel (BPM)    Collection Time: 01/12/18  3:08 AM   Result Value Ref Range    Sodium 133 (L) 135 - 145 mmol/L    Potassium 4.6 3.6 - 5.5 mmol/L    Chloride 106 96 - 112 mmol/L    Co2 23 20 - 33 mmol/L    Glucose 110 (H) 65 - 99 mg/dL    Bun 21 8 - 22 mg/dL    Creatinine 1.32 0.50 - 1.40 mg/dL    Calcium 7.8 (L) 8.5 - 10.5 mg/dL    Anion Gap 4.0 0.0 - 11.9   ESTIMATED GFR    Collection Time: 01/12/18  3:08 AM   Result Value Ref Range    GFR If African American >60 >60 mL/min/1.73 m 2    GFR If Non African American 54 (A) >60 mL/min/1.73 m 2     Medical Decision Making, by Problem:  There are no active hospital problems to display for this patient.    Plan:  - NPO,  awaiting return of GI function, will try clears tomorrow if ostomy continues to function  - IS Q One hour while awake  - Ambulate.  Out of bed for all meals please  - Pain controlled.  Cont PO pain medication  - Labs noted, recheck in am  - Zosyn, cont due to pelvic abcess for colonic perforation X 4 days  - DVT propholaxis, cont Lovenox, sequentials  and ambulate  - Adequate urine output.  Cont, to monitor  - CARISSA with serous drainage, cont  - Ostomy nurse to see pt and start ostomy teaching  - will plan for D/C when GI function returns and pt is comfortable with ostomy care.    Quality Measures:    Reviewed items::  Medications reviewed and Labs reviewed  Hoffmann catheter::  No Hoffmann  DVT prophylaxis pharmacological::  Contraindicated - Anemia requiring blood transfusion  DVT prophylaxis - mechanical:  SCDs  Ulcer Prophylaxis::  Not indicated      Discussed patient condition with Family, RN and Patient

## 2018-01-12 NOTE — CARE PLAN
Problem: Safety  Goal: Will remain free from injury  Outcome: PROGRESSING AS EXPECTED  Wife at bedside at beginning of shift.  Pt ambulated to bathroom with 1 person assist to get up and SBA during ambulation. In bed, pt calls before getting OOB. Upper side rails are up. Hourly rounding in place. Call light within reach. Personal belongings at side table and within reach.    Problem: Knowledge Deficit  Goal: Knowledge of disease process/condition, treatment plan, diagnostic tests, and medications will improve  Outcome: PROGRESSING AS EXPECTED  Reviewed over PRN medications on purpose and usage (Benadryl and Xanax) and discuss possible side effects. Pt agreed on education.

## 2018-01-13 LAB
ANION GAP SERPL CALC-SCNC: 6 MMOL/L (ref 0–11.9)
BASOPHILS # BLD AUTO: 0.9 % (ref 0–1.8)
BASOPHILS # BLD: 0.07 K/UL (ref 0–0.12)
BUN SERPL-MCNC: 18 MG/DL (ref 8–22)
CALCIUM SERPL-MCNC: 7.4 MG/DL (ref 8.5–10.5)
CHLORIDE SERPL-SCNC: 107 MMOL/L (ref 96–112)
CO2 SERPL-SCNC: 18 MMOL/L (ref 20–33)
CREAT SERPL-MCNC: 1.3 MG/DL (ref 0.5–1.4)
EOSINOPHIL # BLD AUTO: 0.42 K/UL (ref 0–0.51)
EOSINOPHIL NFR BLD: 5.6 % (ref 0–6.9)
ERYTHROCYTE [DISTWIDTH] IN BLOOD BY AUTOMATED COUNT: 49.1 FL (ref 35.9–50)
GLUCOSE SERPL-MCNC: 94 MG/DL (ref 65–99)
HCT VFR BLD AUTO: 29.2 % (ref 42–52)
HGB BLD-MCNC: 9.6 G/DL (ref 14–18)
IMM GRANULOCYTES # BLD AUTO: 0.11 K/UL (ref 0–0.11)
IMM GRANULOCYTES NFR BLD AUTO: 1.5 % (ref 0–0.9)
LYMPHOCYTES # BLD AUTO: 1.06 K/UL (ref 1–4.8)
LYMPHOCYTES NFR BLD: 14.2 % (ref 22–41)
MCH RBC QN AUTO: 29.6 PG (ref 27–33)
MCHC RBC AUTO-ENTMCNC: 32.9 G/DL (ref 33.7–35.3)
MCV RBC AUTO: 90.1 FL (ref 81.4–97.8)
MONOCYTES # BLD AUTO: 0.61 K/UL (ref 0–0.85)
MONOCYTES NFR BLD AUTO: 8.2 % (ref 0–13.4)
NEUTROPHILS # BLD AUTO: 5.18 K/UL (ref 1.82–7.42)
NEUTROPHILS NFR BLD: 69.6 % (ref 44–72)
NRBC # BLD AUTO: 0 K/UL
NRBC BLD-RTO: 0 /100 WBC
PLATELET # BLD AUTO: 242 K/UL (ref 164–446)
PMV BLD AUTO: 8.5 FL (ref 9–12.9)
POTASSIUM SERPL-SCNC: 4.2 MMOL/L (ref 3.6–5.5)
RBC # BLD AUTO: 3.24 M/UL (ref 4.7–6.1)
SODIUM SERPL-SCNC: 131 MMOL/L (ref 135–145)
WBC # BLD AUTO: 7.5 K/UL (ref 4.8–10.8)

## 2018-01-13 PROCEDURE — 90471 IMMUNIZATION ADMIN: CPT

## 2018-01-13 PROCEDURE — 85025 COMPLETE CBC W/AUTO DIFF WBC: CPT

## 2018-01-13 PROCEDURE — 90670 PCV13 VACCINE IM: CPT | Performed by: SURGERY

## 2018-01-13 PROCEDURE — 80048 BASIC METABOLIC PNL TOTAL CA: CPT

## 2018-01-13 PROCEDURE — 700102 HCHG RX REV CODE 250 W/ 637 OVERRIDE(OP): Performed by: NURSE PRACTITIONER

## 2018-01-13 PROCEDURE — 3E0234Z INTRODUCTION OF SERUM, TOXOID AND VACCINE INTO MUSCLE, PERCUTANEOUS APPROACH: ICD-10-PCS | Performed by: SURGERY

## 2018-01-13 PROCEDURE — A9270 NON-COVERED ITEM OR SERVICE: HCPCS | Performed by: NURSE PRACTITIONER

## 2018-01-13 PROCEDURE — 700111 HCHG RX REV CODE 636 W/ 250 OVERRIDE (IP): Performed by: NURSE PRACTITIONER

## 2018-01-13 PROCEDURE — 36415 COLL VENOUS BLD VENIPUNCTURE: CPT

## 2018-01-13 PROCEDURE — 700101 HCHG RX REV CODE 250: Performed by: NURSE PRACTITIONER

## 2018-01-13 PROCEDURE — 770006 HCHG ROOM/CARE - MED/SURG/GYN SEMI*

## 2018-01-13 PROCEDURE — 90662 IIV NO PRSV INCREASED AG IM: CPT | Performed by: SURGERY

## 2018-01-13 PROCEDURE — 700111 HCHG RX REV CODE 636 W/ 250 OVERRIDE (IP): Performed by: SURGERY

## 2018-01-13 RX ADMIN — IBUPROFEN 800 MG: 800 TABLET, FILM COATED ORAL at 11:59

## 2018-01-13 RX ADMIN — ACETAMINOPHEN 1000 MG: 500 TABLET ORAL at 18:03

## 2018-01-13 RX ADMIN — PNEUMOCOCCAL 13-VALENT CONJUGATE VACCINE 0.5 ML: 2.2; 2.2; 2.2; 2.2; 2.2; 4.4; 2.2; 2.2; 2.2; 2.2; 2.2; 2.2; 2.2 INJECTION, SUSPENSION INTRAMUSCULAR at 14:56

## 2018-01-13 RX ADMIN — ACETAMINOPHEN 1000 MG: 500 TABLET ORAL at 06:04

## 2018-01-13 RX ADMIN — POTASSIUM CHLORIDE, DEXTROSE MONOHYDRATE AND SODIUM CHLORIDE: 150; 5; 450 INJECTION, SOLUTION INTRAVENOUS at 07:57

## 2018-01-13 RX ADMIN — IBUPROFEN 800 MG: 800 TABLET, FILM COATED ORAL at 18:03

## 2018-01-13 RX ADMIN — ACETAMINOPHEN 1000 MG: 500 TABLET ORAL at 00:36

## 2018-01-13 RX ADMIN — ALPRAZOLAM 0.25 MG: 0.25 TABLET ORAL at 00:40

## 2018-01-13 RX ADMIN — ENOXAPARIN SODIUM 40 MG: 100 INJECTION SUBCUTANEOUS at 07:57

## 2018-01-13 RX ADMIN — INFLUENZA A VIRUSA/MICHIGAN/45/2015 X-275 (H1N1) ANTIGEN (FORMALDEHYDE INACTIVATED), INFLUENZA A VIRUS A/HONG KONG/4801/2014 X-263B (H3N2) ANTIGEN (FORMALDEHYDE INACTIVATED), AND INFLUENZA B VIRUS B/BRISBANE/60/2008 ANTIGEN (FORMALDEHYDE INACTIVATED) 0.5 ML: 60; 60; 60 INJECTION, SUSPENSION INTRAMUSCULAR at 07:57

## 2018-01-13 RX ADMIN — CITALOPRAM HYDROBROMIDE 40 MG: 40 TABLET ORAL at 07:57

## 2018-01-13 RX ADMIN — ACETAMINOPHEN 1000 MG: 500 TABLET ORAL at 11:58

## 2018-01-13 RX ADMIN — IBUPROFEN 800 MG: 800 TABLET, FILM COATED ORAL at 07:57

## 2018-01-13 RX ADMIN — DIPHENHYDRAMINE HYDROCHLORIDE 50 MG: 50 INJECTION, SOLUTION INTRAMUSCULAR; INTRAVENOUS at 22:12

## 2018-01-13 ASSESSMENT — ENCOUNTER SYMPTOMS
FEVER: 0
CARDIOVASCULAR NEGATIVE: 1
RESPIRATORY NEGATIVE: 1
SHORTNESS OF BREATH: 0
VOMITING: 0
NAUSEA: 0
CONSTITUTIONAL NEGATIVE: 1
CHILLS: 0

## 2018-01-13 ASSESSMENT — PAIN SCALES - GENERAL
PAINLEVEL_OUTOF10: 0
PAINLEVEL_OUTOF10: 0

## 2018-01-13 NOTE — PROGRESS NOTES
Report received from day RN. Assumed care at 1915.  A/O x4. Calls appropriately before getting OOB. Wife at bedside to assist with mobility  Declines pain at this time. No pain medication needed at this time.   No c/o of N/V. NPO at this time, but can have sips with meds.  (+) flatus, (+) output through RLQ ostomy, greenish brown in color, hyperactive BS, (+) void  Noted: RLQ ostomy, midline incision open to air, LLQ CARISSA drain (suction bulb w/ sanguenous output)  Ambulated around the unit twice prior to getting into bed. SBA w/ wife at side.  Reviewed POC for evening. All questions answered.   Call light within reach. Bed at lowest position w/ bed brakes locked. Hourly rounding in place.

## 2018-01-13 NOTE — CARE PLAN
Problem: Safety  Goal: Will remain free from injury  Outcome: PROGRESSING AS EXPECTED  Bed low and locked, call light and belongings in reach, hourly rounding in place, family at bedside, calls appropriately for assistance    Problem: Infection  Goal: Will remain free from infection  Outcome: PROGRESSING AS EXPECTED  Frequently assessed for S+S of infection, continuous antibiotics running, CARISSA in place to help drain abd abscess pocket

## 2018-01-13 NOTE — CARE PLAN
Problem: Infection  Goal: Will remain free from infection  Outcome: PROGRESSING AS EXPECTED  Assessed pain and medicated per MAR.  Reminded patient to report any changes in severity or quality of pain in order to best manage pain.    Problem: Pain Management  Goal: Pain level will decrease to patient's comfort goal  Outcome: PROGRESSING AS EXPECTED  Assessed pain and medicated per MAR.  Reminded patient to report any changes in severity or quality of pain in order to best manage pain.

## 2018-01-13 NOTE — CARE PLAN
Problem: Infection  Goal: Will remain free from infection  Outcome: PROGRESSING AS EXPECTED  Monitoring CARISSA drainage for any signs of pus, WBC monitored daily. Continuous Zosyn being ran. Educated to pt to notify RN/ CNA if drainage appears cloudy or out of the usual.     Problem: Knowledge Deficit  Goal: Knowledge of disease process/condition, treatment plan, diagnostic tests, and medications will improve  Outcome: PROGRESSING AS EXPECTED  Reviewed with dayshift RN on ostomy care. Discuss POC for the evening.

## 2018-01-13 NOTE — PROGRESS NOTES
Received bedside report and assumed care of patient.  Assessment complete; discussed POC with patient.  AO x4, patient calls for assistance.  Patient appears calm and exhibits no signs of distress.  Wife at bedside, appearing calm and providing support.  Patient reports pain of 1/10, medicating per MAR with scheduled tylenol and ibuprofen.  Patient tolerating NPO, improved appetite throughout the day.  BS normoactive x 4, LBM per ostomy (large greenish-brown output), patient voids with bedside urinal or ambulating to bathroom.  LLQ CARISSA drainage becoming more serous, moderate amounts.  Patient is standby assist with IV pole, gait steady.  Call light within reach, bed in lowest position, SCDs refused, bed alarm refused.  Will continue to monitor pt for changes in status and provide care.

## 2018-01-13 NOTE — PROGRESS NOTES
Assessment done    A+O x4    RA during the day, CPAP usage at night    Pt states pain /10- medicated per MAR    Active BS x4    RLQ CARISSA in place, draining brown/green fluid    Midline abd incision well approximated with dermabond, DI    LLQ ostomy dressing CDI, draining large amounts of serosanguinous fluid    L upper arm PIV site CDI, D5 1/2NS +20K running at 100 ml/hr, continuous zosyn running at 20.83 ml/hr    Weight on stand up scale: 108.9kg    Up to bathroom, +void  Ambulated in [place] with wife    Bed low and locked, call light and belongings in reach, hourly rounding in place    Discussed POC with MD    All needs met at this time

## 2018-01-13 NOTE — PROGRESS NOTES
Surgical Progress Note    Author: Leonor Orellana      Interval Events:  POD#4 S/P total colectomy, ileostomy and omentectomy.  Doing well.  no Nausea, no vomit.  Feeling must better today. Ostomy functioning.   Pain controlled.  Denies chest pain or SOB.  Ambulating.  CARISSA with serous drainage.     Review of Systems   Constitutional: Negative.  Negative for chills and fever.   Respiratory: Negative.  Negative for shortness of breath.    Cardiovascular: Negative.  Negative for chest pain.   Gastrointestinal: Negative for nausea and vomiting.   Genitourinary: Negative for dysuria.     Hemodynamics:  Temp (24hrs), Av °C (98.6 °F), Min:36.7 °C (98 °F), Max:37.5 °C (99.5 °F)  Temperature: 36.9 °C (98.4 °F)  Pulse  Av.7  Min: 60  Max: 105   Blood Pressure : 122/74     Respiratory:    Respiration: 18, Pulse Oximetry: 96 %        RUL Breath Sounds: Clear, RML Breath Sounds: Clear, RLL Breath Sounds: Diminished, LILIA Breath Sounds: Clear, LLL Breath Sounds: Diminished  Neuro:  GCS       Fluids:    Intake/Output Summary (Last 24 hours) at 01/10/18 0921  Last data filed at 01/10/18 0850   Gross per 24 hour   Intake          8976.64 ml   Output             3550 ml   Net          5426.64 ml        Current Diet Order   Procedures   • DIET NPO     Physical Exam   Constitutional: He is oriented to person, place, and time. He appears well-developed and well-nourished.   Cardiovascular: Normal rate and regular rhythm.    Pulmonary/Chest: Effort normal. No respiratory distress.   Abdominal: He exhibits distension.   incision CDI,  decreased tympany with palpation, but decreased distention today    Stoma pink and viable with +flatus and stool    CARISSA with serous drainage   Musculoskeletal: Normal range of motion.   Neurological: He is alert and oriented to person, place, and time.   Skin: Skin is warm and dry.     Labs:  Recent Results (from the past 24 hour(s))   CBC with Differential    Collection Time: 18  1:41 AM    Result Value Ref Range    WBC 7.5 4.8 - 10.8 K/uL    RBC 3.24 (L) 4.70 - 6.10 M/uL    Hemoglobin 9.6 (L) 14.0 - 18.0 g/dL    Hematocrit 29.2 (L) 42.0 - 52.0 %    MCV 90.1 81.4 - 97.8 fL    MCH 29.6 27.0 - 33.0 pg    MCHC 32.9 (L) 33.7 - 35.3 g/dL    RDW 49.1 35.9 - 50.0 fL    Platelet Count 242 164 - 446 K/uL    MPV 8.5 (L) 9.0 - 12.9 fL    Neutrophils-Polys 69.60 44.00 - 72.00 %    Lymphocytes 14.20 (L) 22.00 - 41.00 %    Monocytes 8.20 0.00 - 13.40 %    Eosinophils 5.60 0.00 - 6.90 %    Basophils 0.90 0.00 - 1.80 %    Immature Granulocytes 1.50 (H) 0.00 - 0.90 %    Nucleated RBC 0.00 /100 WBC    Neutrophils (Absolute) 5.18 1.82 - 7.42 K/uL    Lymphs (Absolute) 1.06 1.00 - 4.80 K/uL    Monos (Absolute) 0.61 0.00 - 0.85 K/uL    Eos (Absolute) 0.42 0.00 - 0.51 K/uL    Baso (Absolute) 0.07 0.00 - 0.12 K/uL    Immature Granulocytes (abs) 0.11 0.00 - 0.11 K/uL    NRBC (Absolute) 0.00 K/uL   Basic Metabolic Panel (BPM)    Collection Time: 01/13/18  1:41 AM   Result Value Ref Range    Sodium 131 (L) 135 - 145 mmol/L    Potassium 4.2 3.6 - 5.5 mmol/L    Chloride 107 96 - 112 mmol/L    Co2 18 (L) 20 - 33 mmol/L    Glucose 94 65 - 99 mg/dL    Bun 18 8 - 22 mg/dL    Creatinine 1.30 0.50 - 1.40 mg/dL    Calcium 7.4 (L) 8.5 - 10.5 mg/dL    Anion Gap 6.0 0.0 - 11.9   ESTIMATED GFR    Collection Time: 01/13/18  1:41 AM   Result Value Ref Range    GFR If African American >60 >60 mL/min/1.73 m 2    GFR If Non African American 55 (A) >60 mL/min/1.73 m 2     Medical Decision Making, by Problem:  There are no active hospital problems to display for this patient.    Plan:  - + ostomy output, will start clear liquids  - IS Q One hour while awake  - Ambulate.  Out of bed for all meals please  - Pain controlled.  Cont PO pain medication  - Labs noted, recheck in am  - Zosyn, cont due to pelvic abcess for colonic perforation X 4 days  - DVT propholaxis, cont Lovenox, sequentials and ambulate  - Adequate urine output.  Cont, to monitor  -  CARISSA with serous drainage, cont  - Ostomy nurse to see pt and start ostomy teaching  - will plan for D/C Monday    Quality Measures:    Reviewed items::  Medications reviewed and Labs reviewed  Hoffmann catheter::  No Hoffmann  DVT prophylaxis pharmacological::  Contraindicated - Anemia requiring blood transfusion  DVT prophylaxis - mechanical:  SCDs  Ulcer Prophylaxis::  Not indicated      Discussed patient condition with Family, RN and Patient

## 2018-01-14 LAB
ANION GAP SERPL CALC-SCNC: 9 MMOL/L (ref 0–11.9)
BASOPHILS # BLD AUTO: 1.2 % (ref 0–1.8)
BASOPHILS # BLD: 0.09 K/UL (ref 0–0.12)
BUN SERPL-MCNC: 21 MG/DL (ref 8–22)
CALCIUM SERPL-MCNC: 8 MG/DL (ref 8.5–10.5)
CHLORIDE SERPL-SCNC: 108 MMOL/L (ref 96–112)
CO2 SERPL-SCNC: 17 MMOL/L (ref 20–33)
CREAT SERPL-MCNC: 1.74 MG/DL (ref 0.5–1.4)
EOSINOPHIL # BLD AUTO: 0.34 K/UL (ref 0–0.51)
EOSINOPHIL NFR BLD: 4.4 % (ref 0–6.9)
ERYTHROCYTE [DISTWIDTH] IN BLOOD BY AUTOMATED COUNT: 50.4 FL (ref 35.9–50)
GLUCOSE SERPL-MCNC: 83 MG/DL (ref 65–99)
HCT VFR BLD AUTO: 31.4 % (ref 42–52)
HGB BLD-MCNC: 10.4 G/DL (ref 14–18)
IMM GRANULOCYTES # BLD AUTO: 0.27 K/UL (ref 0–0.11)
IMM GRANULOCYTES NFR BLD AUTO: 3.5 % (ref 0–0.9)
LYMPHOCYTES # BLD AUTO: 0.92 K/UL (ref 1–4.8)
LYMPHOCYTES NFR BLD: 11.8 % (ref 22–41)
MCH RBC QN AUTO: 30.7 PG (ref 27–33)
MCHC RBC AUTO-ENTMCNC: 33.1 G/DL (ref 33.7–35.3)
MCV RBC AUTO: 92.6 FL (ref 81.4–97.8)
MONOCYTES # BLD AUTO: 0.71 K/UL (ref 0–0.85)
MONOCYTES NFR BLD AUTO: 9.1 % (ref 0–13.4)
NEUTROPHILS # BLD AUTO: 5.47 K/UL (ref 1.82–7.42)
NEUTROPHILS NFR BLD: 70 % (ref 44–72)
NRBC # BLD AUTO: 0 K/UL
NRBC BLD-RTO: 0 /100 WBC
PLATELET # BLD AUTO: 268 K/UL (ref 164–446)
PMV BLD AUTO: 8.2 FL (ref 9–12.9)
POTASSIUM SERPL-SCNC: 4.1 MMOL/L (ref 3.6–5.5)
RBC # BLD AUTO: 3.39 M/UL (ref 4.7–6.1)
SODIUM SERPL-SCNC: 134 MMOL/L (ref 135–145)
WBC # BLD AUTO: 7.8 K/UL (ref 4.8–10.8)

## 2018-01-14 PROCEDURE — 36415 COLL VENOUS BLD VENIPUNCTURE: CPT

## 2018-01-14 PROCEDURE — 770006 HCHG ROOM/CARE - MED/SURG/GYN SEMI*

## 2018-01-14 PROCEDURE — 700111 HCHG RX REV CODE 636 W/ 250 OVERRIDE (IP): Performed by: NURSE PRACTITIONER

## 2018-01-14 PROCEDURE — 700105 HCHG RX REV CODE 258: Performed by: SURGERY

## 2018-01-14 PROCEDURE — A9270 NON-COVERED ITEM OR SERVICE: HCPCS | Performed by: NURSE PRACTITIONER

## 2018-01-14 PROCEDURE — 700111 HCHG RX REV CODE 636 W/ 250 OVERRIDE (IP): Performed by: SURGERY

## 2018-01-14 PROCEDURE — 700102 HCHG RX REV CODE 250 W/ 637 OVERRIDE(OP): Performed by: NURSE PRACTITIONER

## 2018-01-14 PROCEDURE — 700101 HCHG RX REV CODE 250: Performed by: SURGERY

## 2018-01-14 PROCEDURE — 85025 COMPLETE CBC W/AUTO DIFF WBC: CPT

## 2018-01-14 PROCEDURE — 80048 BASIC METABOLIC PNL TOTAL CA: CPT

## 2018-01-14 RX ORDER — HEPARIN SODIUM 5000 [USP'U]/ML
5000 INJECTION, SOLUTION INTRAVENOUS; SUBCUTANEOUS EVERY 8 HOURS
Status: DISCONTINUED | OUTPATIENT
Start: 2018-01-14 | End: 2018-01-16 | Stop reason: HOSPADM

## 2018-01-14 RX ADMIN — ACETAMINOPHEN 1000 MG: 500 TABLET ORAL at 00:11

## 2018-01-14 RX ADMIN — IBUPROFEN 800 MG: 800 TABLET, FILM COATED ORAL at 12:03

## 2018-01-14 RX ADMIN — ENOXAPARIN SODIUM 40 MG: 100 INJECTION SUBCUTANEOUS at 08:22

## 2018-01-14 RX ADMIN — SODIUM CHLORIDE: 9 INJECTION, SOLUTION INTRAVENOUS at 09:22

## 2018-01-14 RX ADMIN — IBUPROFEN 800 MG: 800 TABLET, FILM COATED ORAL at 08:22

## 2018-01-14 RX ADMIN — HEPARIN SODIUM 5000 UNITS: 5000 INJECTION, SOLUTION INTRAVENOUS; SUBCUTANEOUS at 21:17

## 2018-01-14 RX ADMIN — HEPARIN SODIUM 5000 UNITS: 5000 INJECTION, SOLUTION INTRAVENOUS; SUBCUTANEOUS at 15:32

## 2018-01-14 RX ADMIN — ACETAMINOPHEN 1000 MG: 500 TABLET ORAL at 06:15

## 2018-01-14 RX ADMIN — POTASSIUM CHLORIDE, DEXTROSE MONOHYDRATE AND SODIUM CHLORIDE: 150; 5; 450 INJECTION, SOLUTION INTRAVENOUS at 21:26

## 2018-01-14 RX ADMIN — ALPRAZOLAM 0.25 MG: 0.25 TABLET ORAL at 21:17

## 2018-01-14 RX ADMIN — ALPRAZOLAM 0.25 MG: 0.25 TABLET ORAL at 00:11

## 2018-01-14 RX ADMIN — CITALOPRAM HYDROBROMIDE 40 MG: 40 TABLET ORAL at 08:22

## 2018-01-14 ASSESSMENT — PAIN SCALES - GENERAL
PAINLEVEL_OUTOF10: 0

## 2018-01-14 ASSESSMENT — ENCOUNTER SYMPTOMS
CHILLS: 0
FEVER: 0
SHORTNESS OF BREATH: 0
NAUSEA: 0
RESPIRATORY NEGATIVE: 1
CARDIOVASCULAR NEGATIVE: 1
VOMITING: 0
CONSTITUTIONAL NEGATIVE: 1

## 2018-01-14 NOTE — CARE PLAN
Problem: Safety  Goal: Will remain free from injury  Outcome: PROGRESSING AS EXPECTED  Bed low and locked, call light and belongings in reach, hourly rounding in place, family at bedside    Problem: Bowel/Gastric:  Goal: Normal bowel function is maintained or improved  Outcome: PROGRESSING AS EXPECTED  Wound care consulted with pt, pt practicing self ostomy care with staff presence

## 2018-01-14 NOTE — PROGRESS NOTES
Surgical Progress Note    Author: Leonor Orellana      Interval Events:  POD#3 S/P total colectomy, ileostomy and omentectomy.  Doing well.  no Nausea, no vomit.   Overnight with increased creatinine  And increased ostomy output.   Pain controlled.  Denies chest pain or SOB.  Ambulating.  CARISSA with serous drainage.     Review of Systems   Constitutional: Negative.  Negative for chills and fever.   Respiratory: Negative.  Negative for shortness of breath.    Cardiovascular: Negative.  Negative for chest pain.   Gastrointestinal: Negative for nausea and vomiting.   Genitourinary: Negative for dysuria.     Hemodynamics:  Temp (24hrs), Av.1 °C (98.8 °F), Min:36.9 °C (98.4 °F), Max:37.3 °C (99.1 °F)  Temperature: 36.9 °C (98.4 °F)  Pulse  Av.2  Min: 60  Max: 105   Blood Pressure : 133/70     Respiratory:    Respiration: 18, Pulse Oximetry: 97 %        RUL Breath Sounds: Clear, RML Breath Sounds: Clear, RLL Breath Sounds: Diminished, LILIA Breath Sounds: Clear, LLL Breath Sounds: Diminished  Neuro:  GCS       Fluids:    Intake/Output Summary (Last 24 hours) at 01/10/18 0921  Last data filed at 01/10/18 0850   Gross per 24 hour   Intake          8976.64 ml   Output             3550 ml   Net          5426.64 ml        Current Diet Order   Procedures   • DIET ORDER     Physical Exam   Constitutional: He is oriented to person, place, and time. He appears well-developed and well-nourished.   Cardiovascular: Normal rate and regular rhythm.    Pulmonary/Chest: Effort normal. No respiratory distress.   Abdominal:     Improving distention     Stoma pink and viable with +flatus and stool    CARISSA with serous drainage   Musculoskeletal: Normal range of motion.   Neurological: He is alert and oriented to person, place, and time.   Skin: Skin is warm and dry.     Labs:  Recent Results (from the past 24 hour(s))   CBC with Differential    Collection Time: 18  3:04 AM   Result Value Ref Range    WBC 7.8 4.8 - 10.8 K/uL    RBC  3.39 (L) 4.70 - 6.10 M/uL    Hemoglobin 10.4 (L) 14.0 - 18.0 g/dL    Hematocrit 31.4 (L) 42.0 - 52.0 %    MCV 92.6 81.4 - 97.8 fL    MCH 30.7 27.0 - 33.0 pg    MCHC 33.1 (L) 33.7 - 35.3 g/dL    RDW 50.4 (H) 35.9 - 50.0 fL    Platelet Count 268 164 - 446 K/uL    MPV 8.2 (L) 9.0 - 12.9 fL    Neutrophils-Polys 70.00 44.00 - 72.00 %    Lymphocytes 11.80 (L) 22.00 - 41.00 %    Monocytes 9.10 0.00 - 13.40 %    Eosinophils 4.40 0.00 - 6.90 %    Basophils 1.20 0.00 - 1.80 %    Immature Granulocytes 3.50 (H) 0.00 - 0.90 %    Nucleated RBC 0.00 /100 WBC    Neutrophils (Absolute) 5.47 1.82 - 7.42 K/uL    Lymphs (Absolute) 0.92 (L) 1.00 - 4.80 K/uL    Monos (Absolute) 0.71 0.00 - 0.85 K/uL    Eos (Absolute) 0.34 0.00 - 0.51 K/uL    Baso (Absolute) 0.09 0.00 - 0.12 K/uL    Immature Granulocytes (abs) 0.27 (H) 0.00 - 0.11 K/uL    NRBC (Absolute) 0.00 K/uL   Basic Metabolic Panel (BPM)    Collection Time: 01/14/18  3:04 AM   Result Value Ref Range    Sodium 134 (L) 135 - 145 mmol/L    Potassium 4.1 3.6 - 5.5 mmol/L    Chloride 108 96 - 112 mmol/L    Co2 17 (L) 20 - 33 mmol/L    Glucose 83 65 - 99 mg/dL    Bun 21 8 - 22 mg/dL    Creatinine 1.74 (H) 0.50 - 1.40 mg/dL    Calcium 8.0 (L) 8.5 - 10.5 mg/dL    Anion Gap 9.0 0.0 - 11.9   ESTIMATED GFR    Collection Time: 01/14/18  3:04 AM   Result Value Ref Range    GFR If  48 (A) >60 mL/min/1.73 m 2    GFR If Non  39 (A) >60 mL/min/1.73 m 2     Medical Decision Making, by Problem:  There are no active hospital problems to display for this patient.    Plan:  -  Advance diet as tolerated  - IS Q One hour while awake  - Ambulate.  Out of bed for all meals please  - Pain controlled.  Cont PO pain medication  - Labs noted, recheck in am  -  No leukocytosis or fever.  Discontinue Zosyn.    - DVT propholaxis,   Changed to heparin for increased creatinine w, sequentials and ambulate  - Adequate urine output.  Cont, to monitor  Creatinine.  We will repeat labs in  the morning.    - CARISSA with serous drainage,  Discontinue CARISSA drain in the morning  - Ostomy nurse to see pt and start ostomy teaching  - will plan for D/C when GI function returns and pt is comfortable with ostomy care.    Quality Measures:    Reviewed items::  Medications reviewed and Labs reviewed  Hoffmann catheter::  No Hoffmann  DVT prophylaxis pharmacological::  Contraindicated - Anemia requiring blood transfusion  DVT prophylaxis - mechanical:  SCDs  Ulcer Prophylaxis::  Not indicated      Discussed patient condition with Family, RN and Patient

## 2018-01-14 NOTE — WOUND TEAM
"[facility]  Inpatient Services  New Ostomy Management & Teaching    HPI:  Reviewed  PMH: Reviewed   SH: Reviewed    Subjective: \"It is what it is, getting upset isn't going to help. I can do this.\"    Objective:  Appliance intact; supportive wife; patient resigned to ostomy, had a good attitude about it.      Ostomy type:  ileostomy   Stoma location:  RLQ   Stoma assessment:    Appearance:  red, edematous    Size:  1 1/2\"    Protrusion:  Well budded    Output: Minimal,bilious greenish brownish black oily.    MC jxn:   intact    Peristomal skin:  intact     Ostomy Appliance (type and size):  2 1/4\" two piece with paste ring    Interventions:  Met with patient and wife.  She cut barrier with direction and removed old appliance.  She cleansed peristomal skin and both observed crusting procedure again. Wife applied paste ring to barrier opening and she applied to peristomal skin and snapped on pouch closing end of pouch.  Patient secured the end of pouch. Marked catalog. Discussed with patient resources for ostomy support.      Pt education: Questions and concerns addressed; see above    Evaluation:  Output but not stool as of today.  Wife doing well with care and patient is to accepting the current health situation.    Plan: Ostomy nurses to continue to follow for ostomy needs and teaching     Anticipated discharge needs: Supplies, supplier information, lives in [place] and shouldn't need outpatient follow up but informed ? [facility] in [place] that their PCP could refer them to if pouching issues.  "

## 2018-01-14 NOTE — CARE PLAN
Problem: Communication  Goal: The ability to communicate needs accurately and effectively will improve  Outcome: PROGRESSING AS EXPECTED  Discuss plans for discharge, hopefully by the start of next week. Discuss POC for the evening.     Problem: Knowledge Deficit  Goal: Knowledge of disease process/condition, treatment plan, diagnostic tests, and medications will improve  Outcome: PROGRESSING AS EXPECTED  Reinforce education of ostomy care: specifically how to empty ostomy pouch and clean ostomy pouch. Wife at bedside.

## 2018-01-14 NOTE — PROGRESS NOTES
Report received from day RN. Assumed care at 1915.  A/O x4. Calls appropriately before getting OOB. Wife at bedside to assist with mobility  Declines pain at this time. No pain medication needed at this time.   No c/o of N/V.Tolerating full liquid diet fine.  (+) flatus, (+) output through RLQ ostomy, greenish brown in color, normoactive BS, (+) void  Noted: RLQ ostomy, midline incision open to air, LLQ CARISSA drain (suction bulb w/ sanguenous output)  Ambulated during the day multiple times.  Reviewed POC for evening. All questions answered.   Call light within reach. Bed at lowest position w/ bed brakes locked. Hourly rounding in place.

## 2018-01-14 NOTE — PROGRESS NOTES
66 yo M   Full Code  Full Liquid Diet  Admit 1/9- perforated diverticulum, abd abscess    Assessment done     A+O x4     RA during the day, CPAP usage at night     Pt states pain 0/10     Active BS x4     RLQ ostomy in place, draining brown/green fluid     Midline abd incision well approximated with dermabond, DI     LLQ CARISSA DI, draining moderate amounts of serosanguinous fluid     L upper arm PIV site CDI, D5 1/2NS +20K running at 100 ml/hr, continuous zosyn running at 20.83 ml/hr     Ambulated in [place] with wife     Bed low and locked, call light and belongings in reach, hourly rounding in place     Discussed POC      All needs met at this time

## 2018-01-15 LAB
ANION GAP SERPL CALC-SCNC: 7 MMOL/L (ref 0–11.9)
BASOPHILS # BLD AUTO: 0.9 % (ref 0–1.8)
BASOPHILS # BLD: 0.07 K/UL (ref 0–0.12)
BUN SERPL-MCNC: 20 MG/DL (ref 8–22)
CALCIUM SERPL-MCNC: 8 MG/DL (ref 8.5–10.5)
CHLORIDE SERPL-SCNC: 108 MMOL/L (ref 96–112)
CO2 SERPL-SCNC: 19 MMOL/L (ref 20–33)
COMMENT 1642: NORMAL
CREAT SERPL-MCNC: 1.56 MG/DL (ref 0.5–1.4)
EOSINOPHIL # BLD AUTO: 0.28 K/UL (ref 0–0.51)
EOSINOPHIL NFR BLD: 3.8 % (ref 0–6.9)
ERYTHROCYTE [DISTWIDTH] IN BLOOD BY AUTOMATED COUNT: 49.8 FL (ref 35.9–50)
GLUCOSE SERPL-MCNC: 126 MG/DL (ref 65–99)
HCT VFR BLD AUTO: 31.6 % (ref 42–52)
HGB BLD-MCNC: 10.6 G/DL (ref 14–18)
IMM GRANULOCYTES # BLD AUTO: 0.4 K/UL (ref 0–0.11)
IMM GRANULOCYTES NFR BLD AUTO: 5.4 % (ref 0–0.9)
LYMPHOCYTES # BLD AUTO: 1.13 K/UL (ref 1–4.8)
LYMPHOCYTES NFR BLD: 15.3 % (ref 22–41)
MCH RBC QN AUTO: 30.3 PG (ref 27–33)
MCHC RBC AUTO-ENTMCNC: 33.5 G/DL (ref 33.7–35.3)
MCV RBC AUTO: 90.3 FL (ref 81.4–97.8)
MONOCYTES # BLD AUTO: 0.76 K/UL (ref 0–0.85)
MONOCYTES NFR BLD AUTO: 10.3 % (ref 0–13.4)
MORPHOLOGY BLD-IMP: NORMAL
NEUTROPHILS # BLD AUTO: 4.74 K/UL (ref 1.82–7.42)
NEUTROPHILS NFR BLD: 64.3 % (ref 44–72)
NRBC # BLD AUTO: 0 K/UL
NRBC BLD-RTO: 0 /100 WBC
PLATELET # BLD AUTO: 306 K/UL (ref 164–446)
PLATELET BLD QL SMEAR: NORMAL
PMV BLD AUTO: 8.2 FL (ref 9–12.9)
POTASSIUM SERPL-SCNC: 4.1 MMOL/L (ref 3.6–5.5)
RBC # BLD AUTO: 3.5 M/UL (ref 4.7–6.1)
RBC BLD AUTO: NORMAL
SODIUM SERPL-SCNC: 134 MMOL/L (ref 135–145)
WBC # BLD AUTO: 7.4 K/UL (ref 4.8–10.8)

## 2018-01-15 PROCEDURE — 700102 HCHG RX REV CODE 250 W/ 637 OVERRIDE(OP): Performed by: SURGERY

## 2018-01-15 PROCEDURE — 85025 COMPLETE CBC W/AUTO DIFF WBC: CPT

## 2018-01-15 PROCEDURE — 700111 HCHG RX REV CODE 636 W/ 250 OVERRIDE (IP): Performed by: SURGERY

## 2018-01-15 PROCEDURE — 700111 HCHG RX REV CODE 636 W/ 250 OVERRIDE (IP): Performed by: NURSE PRACTITIONER

## 2018-01-15 PROCEDURE — 700102 HCHG RX REV CODE 250 W/ 637 OVERRIDE(OP): Performed by: NURSE PRACTITIONER

## 2018-01-15 PROCEDURE — 700101 HCHG RX REV CODE 250: Performed by: SURGERY

## 2018-01-15 PROCEDURE — 36415 COLL VENOUS BLD VENIPUNCTURE: CPT

## 2018-01-15 PROCEDURE — 80048 BASIC METABOLIC PNL TOTAL CA: CPT

## 2018-01-15 PROCEDURE — A9270 NON-COVERED ITEM OR SERVICE: HCPCS | Performed by: NURSE PRACTITIONER

## 2018-01-15 PROCEDURE — A9270 NON-COVERED ITEM OR SERVICE: HCPCS | Performed by: SURGERY

## 2018-01-15 PROCEDURE — 770006 HCHG ROOM/CARE - MED/SURG/GYN SEMI*

## 2018-01-15 RX ORDER — DIPHENOXYLATE HCL/ATROPINE 2.5-.025/5
5 LIQUID (ML) ORAL 4 TIMES DAILY
Status: DISCONTINUED | OUTPATIENT
Start: 2018-01-15 | End: 2018-01-15

## 2018-01-15 RX ORDER — DIPHENOXYLATE HYDROCHLORIDE AND ATROPINE SULFATE 2.5; .025 MG/1; MG/1
1 TABLET ORAL 4 TIMES DAILY
Status: DISCONTINUED | OUTPATIENT
Start: 2018-01-15 | End: 2018-01-16 | Stop reason: HOSPADM

## 2018-01-15 RX ADMIN — CITALOPRAM HYDROBROMIDE 40 MG: 40 TABLET ORAL at 09:18

## 2018-01-15 RX ADMIN — DIPHENOXYLATE HYDROCHLORIDE AND ATROPINE SULFATE 1 TABLET: 2.5; .025 TABLET ORAL at 21:16

## 2018-01-15 RX ADMIN — DIPHENOXYLATE HYDROCHLORIDE AND ATROPINE SULFATE 1 TABLET: 2.5; .025 TABLET ORAL at 18:33

## 2018-01-15 RX ADMIN — POTASSIUM CHLORIDE, DEXTROSE MONOHYDRATE AND SODIUM CHLORIDE: 150; 5; 450 INJECTION, SOLUTION INTRAVENOUS at 10:07

## 2018-01-15 RX ADMIN — HEPARIN SODIUM 5000 UNITS: 5000 INJECTION, SOLUTION INTRAVENOUS; SUBCUTANEOUS at 21:16

## 2018-01-15 RX ADMIN — HEPARIN SODIUM 5000 UNITS: 5000 INJECTION, SOLUTION INTRAVENOUS; SUBCUTANEOUS at 06:33

## 2018-01-15 RX ADMIN — DIPHENHYDRAMINE HYDROCHLORIDE 25 MG: 50 INJECTION, SOLUTION INTRAMUSCULAR; INTRAVENOUS at 23:00

## 2018-01-15 RX ADMIN — DIPHENOXYLATE HYDROCHLORIDE AND ATROPINE SULFATE 1 TABLET: 2.5; .025 TABLET ORAL at 13:02

## 2018-01-15 RX ADMIN — POTASSIUM CHLORIDE, DEXTROSE MONOHYDRATE AND SODIUM CHLORIDE: 150; 5; 450 INJECTION, SOLUTION INTRAVENOUS at 21:34

## 2018-01-15 RX ADMIN — HEPARIN SODIUM 5000 UNITS: 5000 INJECTION, SOLUTION INTRAVENOUS; SUBCUTANEOUS at 14:40

## 2018-01-15 ASSESSMENT — ENCOUNTER SYMPTOMS
VOMITING: 0
CONSTITUTIONAL NEGATIVE: 1
RESPIRATORY NEGATIVE: 1
SHORTNESS OF BREATH: 0
CHILLS: 0
NAUSEA: 0
CARDIOVASCULAR NEGATIVE: 1
FEVER: 0

## 2018-01-15 NOTE — PROGRESS NOTES
68 yo M   Full Code  Full Liquid Diet  Admit 1/9- perforated diverticulum, abd abscess     Assessment done     A+O x4     RA during the day, CPAP usage at night     Pt states pain no pain, just sore     Active BS x4     RLQ ostomy in place, draining brown/green fluid, lomotil ordered to slow GI motility     Midline abd incision well approximated with dermabond, DI     LLQ CARISSA DI, draining moderate amounts of serosanguinous fluid     L upper arm PIV site CDI, D5 1/2NS +20K running at 83 ml/hr     Ambulated in [place] with wife     Bed low and locked, call light and belongings in reach, hourly rounding in place     Discussed POC      All needs met at this time

## 2018-01-15 NOTE — PROGRESS NOTES
Surgical Progress Note    Author: Leonor Orellana      Interval Events:  POD#6 S/P total colectomy, ileostomy and omentectomy.  Doing well.  no Nausea, no vomit.   Creatinine improved  And borderline high ostomy output.   Pain controlled.  Denies chest pain or SOB.  Ambulating.  CARISSA with serous drainage.  Does state he doesn't feel as good today.  Tired.     Review of Systems   Constitutional: Negative.  Negative for chills and fever.   Respiratory: Negative.  Negative for shortness of breath.    Cardiovascular: Negative.  Negative for chest pain.   Gastrointestinal: Negative for nausea and vomiting.   Genitourinary: Negative for dysuria.     Hemodynamics:  Temp (24hrs), Av.9 °C (98.4 °F), Min:36.7 °C (98.1 °F), Max:37.1 °C (98.8 °F)  Temperature: 36.9 °C (98.4 °F)  Pulse  Av.2  Min: 60  Max: 105   Blood Pressure : 134/74     Respiratory:    Respiration: 17, Pulse Oximetry: 95 %        RUL Breath Sounds: Clear, RML Breath Sounds: Clear, RLL Breath Sounds: Diminished, LILIA Breath Sounds: Clear, LLL Breath Sounds: Diminished  Neuro:  GCS       Fluids:    Intake/Output Summary (Last 24 hours) at 01/10/18 0921  Last data filed at 01/10/18 0850   Gross per 24 hour   Intake          8976.64 ml   Output             3550 ml   Net          5426.64 ml        Current Diet Order   Procedures   • DIET ORDER     Physical Exam   Constitutional: He is oriented to person, place, and time. He appears well-developed and well-nourished.   Cardiovascular: Normal rate and regular rhythm.    Pulmonary/Chest: Effort normal. No respiratory distress.   Abdominal:     Improving distention     Stoma pink and viable with +flatus and stool    CARISSA with serous drainage   Musculoskeletal: Normal range of motion.   Neurological: He is alert and oriented to person, place, and time.   Skin: Skin is warm and dry.     Labs:  Recent Results (from the past 24 hour(s))   CBC with Differential    Collection Time: 01/15/18  3:02 AM   Result Value Ref  Range    WBC 7.4 4.8 - 10.8 K/uL    RBC 3.50 (L) 4.70 - 6.10 M/uL    Hemoglobin 10.6 (L) 14.0 - 18.0 g/dL    Hematocrit 31.6 (L) 42.0 - 52.0 %    MCV 90.3 81.4 - 97.8 fL    MCH 30.3 27.0 - 33.0 pg    MCHC 33.5 (L) 33.7 - 35.3 g/dL    RDW 49.8 35.9 - 50.0 fL    Platelet Count 306 164 - 446 K/uL    MPV 8.2 (L) 9.0 - 12.9 fL    Nucleated RBC 0.00 /100 WBC    NRBC (Absolute) 0.00 K/uL    Neutrophils-Polys 64.30 44.00 - 72.00 %    Lymphocytes 15.30 (L) 22.00 - 41.00 %    Monocytes 10.30 0.00 - 13.40 %    Eosinophils 3.80 0.00 - 6.90 %    Basophils 0.90 0.00 - 1.80 %    Immature Granulocytes 5.40 (H) 0.00 - 0.90 %    Lymphs (Absolute) 1.13 1.00 - 4.80 K/uL    Monos (Absolute) 0.76 0.00 - 0.85 K/uL    Eos (Absolute) 0.28 0.00 - 0.51 K/uL    Baso (Absolute) 0.07 0.00 - 0.12 K/uL    Immature Granulocytes (abs) 0.40 (H) 0.00 - 0.11 K/uL    Neutrophils (Absolute) 4.74 1.82 - 7.42 K/uL   PERIPHERAL SMEAR REVIEW    Collection Time: 01/15/18  3:02 AM   Result Value Ref Range    Peripheral Smear Review see below    PLATELET ESTIMATE    Collection Time: 01/15/18  3:02 AM   Result Value Ref Range    Plt Estimation Normal    MORPHOLOGY    Collection Time: 01/15/18  3:02 AM   Result Value Ref Range    RBC Morphology Normal    DIFFERENTIAL COMMENT    Collection Time: 01/15/18  3:02 AM   Result Value Ref Range    Comments-Diff see below    Basic Metabolic Panel (BPM)    Collection Time: 01/15/18  3:03 AM   Result Value Ref Range    Sodium 134 (L) 135 - 145 mmol/L    Potassium 4.1 3.6 - 5.5 mmol/L    Chloride 108 96 - 112 mmol/L    Co2 19 (L) 20 - 33 mmol/L    Glucose 126 (H) 65 - 99 mg/dL    Bun 20 8 - 22 mg/dL    Creatinine 1.56 (H) 0.50 - 1.40 mg/dL    Calcium 8.0 (L) 8.5 - 10.5 mg/dL    Anion Gap 7.0 0.0 - 11.9   ESTIMATED GFR    Collection Time: 01/15/18  3:03 AM   Result Value Ref Range    GFR If  54 (A) >60 mL/min/1.73 m 2    GFR If Non African American 45 (A) >60 mL/min/1.73 m 2     Medical Decision Making, by  Problem:  There are no active hospital problems to display for this patient.    Plan:  - Cont regular diet as tolerated  - IS Q One hour while awake  - Ambulate.  Out of bed for all meals please  - Pain controlled.  Cont PO pain medication  - Labs noted, recheck in am  -  No leukocytosis or fever.    - DVT propholaxis,   Cont heparin for increased creatinine w, sequentials and ambulate  - Adequate urine output.  Cont, to monitor  Creatinine.  We will repeat labs in the morning.    - CARISSA with serous drainage,  Will keep CARISSA drain one more day   - Ostomy nurse to see pt and start ostomy teaching  - will plan for D/C when GI function returns and pt is comfortable with ostomy care. If creatinine cont to improve and pt has thicker output will plan for  D/C tomorrow    Quality Measures:    Reviewed items::  Medications reviewed and Labs reviewed  Hoffmann catheter::  No Hoffmann  DVT prophylaxis pharmacological::  Contraindicated - Anemia requiring blood transfusion  DVT prophylaxis - mechanical:  SCDs  Ulcer Prophylaxis::  Not indicated      Discussed patient condition with Family, RN and Patient

## 2018-01-15 NOTE — PROGRESS NOTES
Pt A&O x4. Wife present at bedside.    Vitals: /62   Pulse 85   Temp 36.7 °C (98.1 °F)   Resp 18   Ht 1.829 m (6')   Wt 108.9 kg (240 lb 1.3 oz)   SpO2 97%   BMI 32.56 kg/m²     Pt states pain is managed. Declines pain interventions at this time.     Neuro: OLIVEIRA. Denies new onset of numbness/ tingling.    Cardiac: Denies new onset of chest pain.    Vascular: Pulses 2+ BUE, BLE. No edema noted.    Respiratory: Lungs sound diminished in bases. Pulling 2500 on IS, effective, strong effort. On RA. Denies SOB. CPAP at night, ready at bedside.    GI: Abdomen firm, rounded, distended, obese. + bowel sounds, + flatus, + output into ostomy appliance, loose, dark green/ brown. On low fiber, GI soft diet, tolerating well. - nausea/ vomiting.    : Pt voiding adequately.     MSK: Pt adjusts self frequently in bed. Up to bathroom with SBA, tolerating well.     Integumentary: Midline abdominal incision CDI, approximated, Dermabond, DI. Left abdominal CARISSA drain in place, patent, dressing CDI, small/ serosanguinous/ clots output. RLQ ileostomy in place, appliance intact, no drainage noted around appliance. Pt and wife provide self care to ostomy effectively.     Labs noted.    Fall precautions in place: Bed locked in lowest position, Upper bed rails up, treaded socks in place, personal belongings within reach, call light within reach, appropriate mobility signs in place, - bed alarm. Pt calls appropriately.     Pt updated on POC.

## 2018-01-15 NOTE — WOUND TEAM
"[facility]  Inpatient Services  New Ostomy Management & Teaching    HPI:  Reviewed  PMH: Reviewed   SH: Reviewed    Subjective: \"I haven't decided if I will have it reversed or not.\"    Objective:  Appliance intact; supportive wife.      Ostomy type:  ileostomy   Stoma location:  RLQ   Stoma assessment:    Appearance:  red, edematous    Size:  1 1/2\"    Protrusion:  Well budded    Output: Minimal,bilious greenish brownish.    MC jxn:   intact    Peristomal skin:  intact     Ostomy Appliance (type and size):  2 1/4\" two piece with paste ring    Interventions:  Met with patient and wife.  Patients wife preformed all steps with minimal prompting.  She cut barrier and removed old appliance.  She cleansed peristomal. Wife applied paste ring to barrier opening and she applied to peristomal skin and snapped on pouch closing end of pouch.  Patient secured the end of pouch. Catalog previously marked and crusting previously observed.  Supplies given.          Pt education: Questions and concerns addressed; see above    Evaluation:  Wife doing well with care and very comfortable with appliance changes.  patient is to accepting the current health situation.    Plan:  Likely DC tomorrow back to [place].  Follow up with Dr. Geiger and at [facility] for OP ostomy care PRN.     Anticipated discharge needs: Supplies, supplier information, lives in [place] and shouldn't need outpatient follow up but informed ? [facility] in [place] that their PCP could refer them to if pouching issues.  "

## 2018-01-15 NOTE — CARE PLAN
Problem: Safety  Goal: Will remain free from falls  Outcome: PROGRESSING AS EXPECTED    Intervention: Assess risk factors for falls   01/14/18 2000   OTHER   Fall Risk Risk to Fall - 0 - 1 point   Risk for Injury-Any positive answers results in the pt being at high risk for fall related injury Surgery: Thoracic or Abdominal Surgery or Lower Limb Amputation   Mobility Status Assessment 0-Ambulates & Transfers Independently. No Assistance Required   History of fall 0   Pt Calls for Assistance Yes;No assistance required     Intervention: Implement fall precautions   01/14/18 2000   OTHER   Environmental Precautions Treaded Slipper Socks on Patient;Personal Belongings, Wastebasket, Call Bell etc. in Easy Reach;Report Given to Other Health Care Providers Regarding Fall Risk;Bed in Low Position;Communication Sign for Patients & Families;Mobility Assessed & Appropriate Sign Placed   IV Pole on Same Side of Bed as Bathroom Yes   Bedrails Bedrails Closest to Bathroom Down         Problem: Venous Thromboembolism (VTW)/Deep Vein Thrombosis (DVT) Prevention:  Goal: Patient will participate in Venous Thrombosis (VTE)/Deep Vein Thrombosis (DVT)Prevention Measures  Outcome: PROGRESSING AS EXPECTED   01/14/18 2000   Mechanical/VTE Prophylaxis   Mechanical Prophylaxis  SCDs, Sequential Compression Device   SCDs, Sequential Compression Device On   OTHER   Risk Assessment Score 5   VTE RISK Very High   Pharmacologic Prophylaxis Used Unfractionated Heparin

## 2018-01-15 NOTE — PROGRESS NOTES
Assumed care of patient from YAKOV Degroot.  Patient is alert and oriented times 4, denies pain at this time.  VSS /64   Pulse 81   Temp 37.1 °C (98.8 °F)   Resp 18   Ht 1.829 m (6')   Wt 108.9 kg (240 lb 1.3 oz)   SpO2 96%   BMI 32.56 kg/m²   PIV in the L upper arm, patent and running D5 1/2NS with 20K at 83mL/hr.  CARISSA drain to the LLQ.  Ostomy to the RLQ.  Midline incision, well approximated and open to air.  Low fiber GI diet, tolerating well.  Patient is up self, demonstrates steady gait and ambulates frequently with his wife.  POC discussed for the remainder of the shift, bed is locked and in the lowest position, call light is within reach.  All needs are met at this time, hourly rounding is in place.

## 2018-01-15 NOTE — CARE PLAN
Problem: Safety  Goal: Will remain free from injury  Outcome: PROGRESSING AS EXPECTED  Bed low and locked, call light and belongings in reach, hourly rounding in place, family at bedside    Problem: Bowel/Gastric:  Goal: Normal bowel function is maintained or improved  Outcome: PROGRESSING AS EXPECTED  Ostomy in place to RLQ, draining green/black fluid, lomotil ordered to slow GI motility

## 2018-01-16 VITALS
BODY MASS INDEX: 32.52 KG/M2 | RESPIRATION RATE: 18 BRPM | HEIGHT: 72 IN | OXYGEN SATURATION: 96 % | DIASTOLIC BLOOD PRESSURE: 81 MMHG | SYSTOLIC BLOOD PRESSURE: 145 MMHG | WEIGHT: 240.08 LBS | HEART RATE: 80 BPM | TEMPERATURE: 98.2 F

## 2018-01-16 PROBLEM — R19.7 DIARRHEA: Status: ACTIVE | Noted: 2018-01-16

## 2018-01-16 PROBLEM — G89.18 ACUTE POSTOPERATIVE PAIN OF ABDOMEN: Status: ACTIVE | Noted: 2018-01-16

## 2018-01-16 PROBLEM — R10.9 ACUTE POSTOPERATIVE PAIN OF ABDOMEN: Status: ACTIVE | Noted: 2018-01-16

## 2018-01-16 LAB
ANION GAP SERPL CALC-SCNC: 5 MMOL/L (ref 0–11.9)
BASOPHILS # BLD AUTO: 1.1 % (ref 0–1.8)
BASOPHILS # BLD: 0.08 K/UL (ref 0–0.12)
BUN SERPL-MCNC: 14 MG/DL (ref 8–22)
CALCIUM SERPL-MCNC: 7.4 MG/DL (ref 8.5–10.5)
CHLORIDE SERPL-SCNC: 108 MMOL/L (ref 96–112)
CO2 SERPL-SCNC: 19 MMOL/L (ref 20–33)
COMMENT 1642: NORMAL
CREAT SERPL-MCNC: 1.22 MG/DL (ref 0.5–1.4)
EOSINOPHIL # BLD AUTO: 0.34 K/UL (ref 0–0.51)
EOSINOPHIL NFR BLD: 4.7 % (ref 0–6.9)
ERYTHROCYTE [DISTWIDTH] IN BLOOD BY AUTOMATED COUNT: 50.3 FL (ref 35.9–50)
GLUCOSE SERPL-MCNC: 95 MG/DL (ref 65–99)
HCT VFR BLD AUTO: 28.9 % (ref 42–52)
HGB BLD-MCNC: 9.7 G/DL (ref 14–18)
IMM GRANULOCYTES # BLD AUTO: 0.46 K/UL (ref 0–0.11)
IMM GRANULOCYTES NFR BLD AUTO: 6.3 % (ref 0–0.9)
LYMPHOCYTES # BLD AUTO: 1.43 K/UL (ref 1–4.8)
LYMPHOCYTES NFR BLD: 19.6 % (ref 22–41)
MCH RBC QN AUTO: 30.6 PG (ref 27–33)
MCHC RBC AUTO-ENTMCNC: 33.6 G/DL (ref 33.7–35.3)
MCV RBC AUTO: 91.2 FL (ref 81.4–97.8)
MONOCYTES # BLD AUTO: 0.82 K/UL (ref 0–0.85)
MONOCYTES NFR BLD AUTO: 11.2 % (ref 0–13.4)
MORPHOLOGY BLD-IMP: NORMAL
NEUTROPHILS # BLD AUTO: 4.17 K/UL (ref 1.82–7.42)
NEUTROPHILS NFR BLD: 57.1 % (ref 44–72)
NRBC # BLD AUTO: 0 K/UL
NRBC BLD-RTO: 0 /100 WBC
PLATELET # BLD AUTO: 292 K/UL (ref 164–446)
PLATELET BLD QL SMEAR: NORMAL
PMV BLD AUTO: 8.4 FL (ref 9–12.9)
POTASSIUM SERPL-SCNC: 4.3 MMOL/L (ref 3.6–5.5)
RBC # BLD AUTO: 3.17 M/UL (ref 4.7–6.1)
RBC BLD AUTO: NORMAL
SODIUM SERPL-SCNC: 132 MMOL/L (ref 135–145)
WBC # BLD AUTO: 7.3 K/UL (ref 4.8–10.8)

## 2018-01-16 PROCEDURE — 85025 COMPLETE CBC W/AUTO DIFF WBC: CPT

## 2018-01-16 PROCEDURE — 700111 HCHG RX REV CODE 636 W/ 250 OVERRIDE (IP): Performed by: SURGERY

## 2018-01-16 PROCEDURE — 700101 HCHG RX REV CODE 250: Performed by: SURGERY

## 2018-01-16 PROCEDURE — 36415 COLL VENOUS BLD VENIPUNCTURE: CPT

## 2018-01-16 PROCEDURE — 80048 BASIC METABOLIC PNL TOTAL CA: CPT

## 2018-01-16 RX ORDER — DIPHENOXYLATE HYDROCHLORIDE AND ATROPINE SULFATE 2.5; .025 MG/1; MG/1
1 TABLET ORAL 2 TIMES DAILY PRN
Qty: 30 TAB | Refills: 0 | Status: SHIPPED | OUTPATIENT
Start: 2018-01-16 | End: 2018-01-31

## 2018-01-16 RX ADMIN — HEPARIN SODIUM 5000 UNITS: 5000 INJECTION, SOLUTION INTRAVENOUS; SUBCUTANEOUS at 06:06

## 2018-01-16 RX ADMIN — POTASSIUM CHLORIDE, DEXTROSE MONOHYDRATE AND SODIUM CHLORIDE: 150; 5; 450 INJECTION, SOLUTION INTRAVENOUS at 06:06

## 2018-01-16 ASSESSMENT — ENCOUNTER SYMPTOMS
CONSTITUTIONAL NEGATIVE: 1
RESPIRATORY NEGATIVE: 1
FEVER: 0
NAUSEA: 0
CARDIOVASCULAR NEGATIVE: 1
SHORTNESS OF BREATH: 0
CHILLS: 0
VOMITING: 0

## 2018-01-16 ASSESSMENT — PAIN SCALES - GENERAL: PAINLEVEL_OUTOF10: 0

## 2018-01-16 NOTE — PROGRESS NOTES
Pt A&O x4. Wife present at bedside.    Vitals: /68   Pulse 95   Temp 37 °C (98.6 °F)   Resp 18   Ht 1.829 m (6')   Wt 108.9 kg (240 lb 1.3 oz)   SpO2 95%   BMI 32.56 kg/m²     Pt states pain is managed. Declines pain interventions at this time.     Neuro: OLIVEIRA. Denies new onset of numbness/ tingling.    Cardiac: Denies new onset of chest pain.    Vascular: Pulses 2+ BUE, BLE. No edema noted.    Respiratory: Lungs sound diminished in bases. Pulling 2500 on IS, effective, strong effort. On RA. Denies SOB. CPAP at night, ready at bedside.    GI: Abdomen semi-firm, rounded, distended, obese. + bowel sounds, + flatus, + output into ostomy appliance, soft, dark green. On low fiber, GI soft diet, tolerating well. - nausea/ vomiting.    : Pt voiding adequately.     MSK: Up to bathroom with SBA, tolerating well.     Integumentary: Midline abdominal incision CDI, approximated, Dermabond, DI. Left abdominal CARISSA drain sutured in place, patent, dressing CDI, moderate/ serosanguinous w/ clots output. RLQ ileostomy in place, appliance intact, no drainage noted around appliance. Pt and wife provide self care to ostomy effectively.     Labs noted.    Fall precautions in place: Bed locked in lowest position, Upper bed rails up, treaded socks in place, personal belongings within reach, call light within reach, appropriate mobility signs in place, - bed alarm. Pt calls appropriately.     Pt updated on POC.

## 2018-01-16 NOTE — WOUND TEAM
Met with patient and wife and sending home with 6 appliances and paste ring along with Coloplast sample.  They verbalized understanding of when to change appliance, ordering supplies and resources available.  Ready for discharge from ostomy perspective.

## 2018-01-16 NOTE — PROGRESS NOTES
Surgical Progress Note    Author: Leonor Orellana      Interval Events:  POD#7 S/P total colectomy, ileostomy and omentectomy.  Doing well.  no Nausea, no vomit.   Creatinine improved  Thick ostomy output.   Pain controlled.  Denies chest pain or SOB.  Ambulating.  CARISSA with serous drainage.  Feels better today.  Ready to go home.     Review of Systems   Constitutional: Negative.  Negative for chills and fever.   Respiratory: Negative.  Negative for shortness of breath.    Cardiovascular: Negative.  Negative for chest pain.   Gastrointestinal: Negative for nausea and vomiting.   Genitourinary: Negative for dysuria.     Hemodynamics:  Temp (24hrs), Av.8 °C (98.3 °F), Min:36.7 °C (98 °F), Max:37 °C (98.6 °F)  Temperature: 36.8 °C (98.2 °F)  Pulse  Av.5  Min: 60  Max: 105   Blood Pressure : 133/77     Respiratory:    Respiration: 18, Pulse Oximetry: 98 %        RUL Breath Sounds: Clear, RML Breath Sounds: Clear, RLL Breath Sounds: Diminished, LILIA Breath Sounds: Clear, LLL Breath Sounds: Diminished  Neuro:  GCS       Fluids:    Intake/Output Summary (Last 24 hours) at 01/10/18 0921  Last data filed at 01/10/18 0850   Gross per 24 hour   Intake          8976.64 ml   Output             3550 ml   Net          5426.64 ml        Current Diet Order   Procedures   • DIET ORDER     Physical Exam   Constitutional: He is oriented to person, place, and time. He appears well-developed and well-nourished.   Cardiovascular: Normal rate and regular rhythm.    Pulmonary/Chest: Effort normal. No respiratory distress.   Abdominal:     Improving distention     Stoma pink and viable with +flatus and stool    CARISSA with serous drainage   Musculoskeletal: Normal range of motion.   Neurological: He is alert and oriented to person, place, and time.   Skin: Skin is warm and dry.     Labs:  Recent Results (from the past 24 hour(s))   CBC with Differential    Collection Time: 18  2:13 AM   Result Value Ref Range    WBC 7.3 4.8 - 10.8  K/uL    RBC 3.17 (L) 4.70 - 6.10 M/uL    Hemoglobin 9.7 (L) 14.0 - 18.0 g/dL    Hematocrit 28.9 (L) 42.0 - 52.0 %    MCV 91.2 81.4 - 97.8 fL    MCH 30.6 27.0 - 33.0 pg    MCHC 33.6 (L) 33.7 - 35.3 g/dL    RDW 50.3 (H) 35.9 - 50.0 fL    Platelet Count 292 164 - 446 K/uL    MPV 8.4 (L) 9.0 - 12.9 fL    Nucleated RBC 0.00 /100 WBC    NRBC (Absolute) 0.00 K/uL    Neutrophils-Polys 57.10 44.00 - 72.00 %    Lymphocytes 19.60 (L) 22.00 - 41.00 %    Monocytes 11.20 0.00 - 13.40 %    Eosinophils 4.70 0.00 - 6.90 %    Basophils 1.10 0.00 - 1.80 %    Immature Granulocytes 6.30 (H) 0.00 - 0.90 %    Neutrophils (Absolute) 4.17 1.82 - 7.42 K/uL    Lymphs (Absolute) 1.43 1.00 - 4.80 K/uL    Monos (Absolute) 0.82 0.00 - 0.85 K/uL    Eos (Absolute) 0.34 0.00 - 0.51 K/uL    Baso (Absolute) 0.08 0.00 - 0.12 K/uL    Immature Granulocytes (abs) 0.46 (H) 0.00 - 0.11 K/uL   Basic Metabolic Panel (BPM)    Collection Time: 01/16/18  2:13 AM   Result Value Ref Range    Sodium 132 (L) 135 - 145 mmol/L    Potassium 4.3 3.6 - 5.5 mmol/L    Chloride 108 96 - 112 mmol/L    Co2 19 (L) 20 - 33 mmol/L    Glucose 95 65 - 99 mg/dL    Bun 14 8 - 22 mg/dL    Creatinine 1.22 0.50 - 1.40 mg/dL    Calcium 7.4 (L) 8.5 - 10.5 mg/dL    Anion Gap 5.0 0.0 - 11.9   ESTIMATED GFR    Collection Time: 01/16/18  2:13 AM   Result Value Ref Range    GFR If African American >60 >60 mL/min/1.73 m 2    GFR If Non African American 59 (A) >60 mL/min/1.73 m 2   PERIPHERAL SMEAR REVIEW    Collection Time: 01/16/18  2:13 AM   Result Value Ref Range    Peripheral Smear Review see below    PLATELET ESTIMATE    Collection Time: 01/16/18  2:13 AM   Result Value Ref Range    Plt Estimation Normal    MORPHOLOGY    Collection Time: 01/16/18  2:13 AM   Result Value Ref Range    RBC Morphology Normal    DIFFERENTIAL COMMENT    Collection Time: 01/16/18  2:13 AM   Result Value Ref Range    Comments-Diff see below      Medical Decision Making, by Problem:  There are no active hospital  problems to display for this patient.    Plan:  - Cont regular diet as tolerated  - IS Q One hour while awake  - Ambulate.  Out of bed for all meals please  - Pain controlled.  Cont PO pain medication  - Labs noted, improved  -  No leukocytosis or fever.    - DVT propholaxis,   Cont heparin for increased creatinine w, sequentials and ambulate  - Adequate urine output.    - CARISSA with serous drainage,  Will d/c drain  - Ostomy nurse to see pt and cont ostomy teaching, give home supplies  - thicker ostomy output.  Will D/C home.. Discharge instructions given.  Precautions and limitations reviewed.  Pt stated understanding and agrees with this plan of care.  F/U in one week and prn.     Quality Measures:    Reviewed items::  Medications reviewed and Labs reviewed  Hoffmann catheter::  No Hoffmann  DVT prophylaxis pharmacological::  Contraindicated - Anemia requiring blood transfusion  DVT prophylaxis - mechanical:  SCDs  Ulcer Prophylaxis::  Not indicated      Discussed patient condition with Family, RN and Patient

## 2018-01-16 NOTE — DISCHARGE PLANNING
Medical Social Work    This  and MSW Shira met with pt at bedside.  Pt feels confident about discharge and going home.  IMM provided.  Copy placed in chart and provided to pt.

## 2018-01-16 NOTE — DISCHARGE INSTRUCTIONS
Discharge Instructions    Discharged to home by car with relative. Discharged via wheelchair, hospital escort: Yes.  Special equipment needed: Not Applicable    Be sure to schedule a follow-up appointment with your primary care doctor or any specialists as instructed.     Discharge Plan:   Pneumococcal Vaccine Given - only chart on this line when given: Given (See MAR)  Influenza Vaccine Indication: Indicated: 65 years and older  Influenza Vaccine Given - only chart on this line when given: Influenza Vaccine Given (See MAR)    I understand that a diet low in cholesterol, fat, and sodium is recommended for good health. Unless I have been given specific instructions below for another diet, I accept this instruction as my diet prescription.     Special Instructions:  Colectomy D/C instructions:     1. DIET: A low fiber diet is recommended.     The following foods are generally allowed on a low-fiber diet:     Enriched white bread or rolls without seeds   White rice, plain white pasta, noodles and macaroni   Crackers   Refined cereals such as Cream of Wheat   Pancakes or waffles made from white refined flour   Most canned or cooked fruits without skins, seeds or membranes   Fruit and vegetable juice with little or no pulp, fruit-flavored drinks and flavored steele   Canned or well-cooked vegetables without seeds, hulls or skins, such as carrots, potatoes and tomatoes   Tender meat, poultry and fish   Eggs   Tofu   Creamy peanut butter -- up to 2 tablespoons a day   Milk and foods made from milk, such as yogurt, pudding, ice cream, cheeses and sour cream -- up to 2 cups a day, including any used in cooking   Butter, margarine, oils and salad dressings without seeds   Desserts with no whole grains, seeds, nuts, raisins or coconut     You should avoid the following foods:     Whole-wheat or whole-grain breads, cereals and pasta   Brown or wild rice and other whole grains such as oats, kasha, barley, quinoa   Dried fruits  and prune juice   Raw fruit, including those with seeds, skin or membranes, such as berries   Raw or undercooked vegetables, including corn   Dried beans, peas and lentils   Seeds and nuts, and foods containing them   Coconut   Popcorn     If you're eating a low-fiber diet, a typical menu might look like this:     Breakfast:   1 glass milk   1 egg   1 slice of white toast with smooth jelly   1/2 cup canned peaches   Snack:   1 cup yogurt   Lunch:   1 to 2 cups of chicken noodle soup   Soda crackers   [place] of drained tuna with mayonnaise or salad dressing on white bread   Canned applesauce   Flavored water or iced tea     Snack:   White toast, bread or crackers   2 tablespoons creamy peanut butter   Flavored water     Dinner:   3 ounces lean meat, poultry or fish   1/2 cup white rice   1/2 cup cooked vegetables, such as carrots or green beans   1 enriched white dinner roll with butter   Hot tea     Prepare all foods so that they're tender. Good cooking methods include simmering, poaching, stewing, steaming and braising. Baking or microwaving in a covered dish is another option. Try to avoid roasting, broiling and grilling -- methods that tend to make foods dry and tough. You may also want to avoid fried foods and go easy on spices.   Keep in mind that you may have fewer bowel movements and smaller stools while you're following a low-fiber diet. To avoid constipation, you may need to drink extra fluids. Drink plenty of water unless your doctor tells you otherwise, and use juices and milk as noted.     2. ACTIVITIES: After discharge from the hospital, you may resume full routine activities as you feel up to them.  You have a 10 pound weight restriction for two weeks after surgery. This means no lifting anything heavier than a gallon of milk. Routine activities such as bathing, walking, going up and down stairs, and driving* (see below) are safe.     3. DRIVING: You may drive whenever you are off pain medications  and are able to perform the activities needed to drive, i.e. turning, bending, twisting, etc.     4. BATHING: no restrictions, unless you have a drain in place, in which case, showering is okay, but no baths or pools until after your first postoperative appointment.     5. PAIN MEDICATION: You will be given a prescription for pain medication at discharge. Please take these as directed. It is important to remember not to take medications on an empty stomach as this may cause nausea.     6. BOWEL FUNCTION: A few patients, after this operation, will develop either frequent or loose stools after meals. This usually corrects itself after a few days, to a few months.     7. APPOINTMENT: Contact our office at [phone] for a follow-up appointment in 1-2 weeks following your procedure.     8. CALL IF YOU HAVE: (1) Fevers to more than 101F, (2) Unusual chest or leg pain, (3) Drainage or fluid from incision that may be foul smelling, increased tenderness or soreness at the wound or the wound edges are no longer together, redness or swelling at the incision site. Please do not hesitate to call with any other questions.       If you have any additional questions, please do not hesitate to call the office and speak to either myself or the physician on call.     Nghia Geiger MD PhD   [facility]   Colon and Rectal Surgeon   [phone]    · Is patient discharged on Warfarin / Coumadin?   No     · Is patient Post Blood Transfusion?  No    Depression / Suicide Risk    As you are discharged from this [facility] facility, it is important to learn how to keep safe from harming yourself.    Recognize the warning signs:  · Abrupt changes in personality, positive or negative- including increase in energy   · Giving away possessions  · Change in eating patterns- significant weight changes-  positive or negative  · Change in sleeping patterns- unable to sleep or sleeping all the time   · Unwillingness or inability to  communicate  · Depression  · Unusual sadness, discouragement and loneliness  · Talk of wanting to die  · Neglect of personal appearance   · Rebelliousness- reckless behavior  · Withdrawal from people/activities they love  · Confusion- inability to concentrate     If you or a loved one observes any of these behaviors or has concerns about self-harm, here's what you can do:  · Talk about it- your feelings and reasons for harming yourself  · Remove any means that you might use to hurt yourself (examples: pills, rope, extension cords, firearm)  · Get professional help from the community (Mental Health, Substance Abuse, psychological counseling)  · Do not be alone:Call your Safe Contact- someone whom you trust who will be there for you.  · Call your local CRISIS HOTLINE 781-9982 or 914-318-6957  · Call your local Children's Mobile Crisis Response Team [place] (414) 994-6201 or www.JamHub  · Call the toll free National Suicide Prevention Hotlines   · National Suicide Prevention Lifeline 342-866-VVSQ (7530)  · National Hope Line Network 800-SUICIDE (935-4495)    Colostomy Home Guide  A colostomy is an opening for stool to leave your body when a medical condition prevents it from leaving through the usual opening (rectum). During a surgery, a piece of large intestine (colon) is brought through a hole in the abdominal wall. The new opening is called a stoma or ostomy. A bag or pouch fits over the stoma to catch stool and gas. Your stool may be liquid, somewhat pasty, or formed.  CARING FOR YOUR STOMA   Normally, the stoma looks a lot like the inside of your cheek: pink, red, and moist. At first it may be swollen, but this swelling will decrease within 6 weeks.  Keep the skin around your stoma clean and dry. You can gently wash your stoma and the skin around your stoma in the shower with a clean, soft washcloth. If you develop any skin irritation, your caregiver may give you a stoma powder or ointment to help  heal the area. Do not use any products other than those specifically given to you by your caregiver.   Your stoma should not be uncomfortable. If you notice any stinging or burning, your pouch may be leaking, and the skin around your stoma may be coming into contact with stool. This can cause skin irritation. If you notice stinging, replace your pouch with a new one and discard the old one.  OSTOMY POUCHES   The pouch that fits over the ostomy can be made up of either 1 or 2 pieces. A one-piece pouch has a skin barrier piece and the pouch itself in one unit. A two-piece pouch has a skin barrier with a separate pouch that snaps on and off of the skin barrier. Either way, you should empty the pouch when it is only  to ½ full. Do not let more stool or gas build up. This could cause the pouch to leak.  Some ostomy bags have a built-in gas release valve. Ostomy deodorizer (5 drops) can be put into the pouch to prevent odor. Some people use ostomy lubricant drops inside the pouch to help the stool slide out of the bag more easily and completely.   EMPTYING YOUR OSTOMY POUCH   You may get lessons on how to empty your pouch from a wound-ostomy nurse before you leave the hospital. Here are the basic steps:  · Wash your hands with soap and water.  · Sit far back on the toilet.  · Put several pieces of toilet paper into the toilet water. This will prevent splashing as you empty the stool into the toilet bowl.  · Unclip or unvelcro the tail end of the pouch.  · Unroll the tail and empty stool into the toilet.  · Clean the tail with toilet paper.  · Reroll the tail, and clip or velcro it closed.  · Wash your hands again.  CHANGING YOUR OSTOMY POUCH   Change your ostomy pouch about every 3 to 4 days for the first 6 weeks, then every 5 to7 days. Always change the bag sooner if there is any leakage or you begin to notice any discomfort or irritation of the skin around the stoma. When possible, plan to change your ostomy pouch  before eating or drinking as this will lessen the chance of stool coming out during the pouch change. A wound-ostomy nurse may teach you how to change your pouch before you leave the hospital. Here are the basic steps:  · Lay out your supplies.  · Wash your hands with soap and water.  · Carefully remove the old pouch.  · Wash the stoma and allow it to dry. Men may be advised to shave any hair around the stoma very carefully. This will make the adhesive stick better.  · Use the stoma measuring guide that comes with your pouch set to decide what size hole you will need to cut in the skin barrier piece. Choose the smallest possible size that will hold the stoma but will not touch it.  · Use the guide to trace the [place] on the back of the skin barrier piece. Cut out the hole.  · Hold the skin barrier piece over the stoma to make sure the hole is the correct size.  · Remove the adhesive paper backing from the skin barrier piece.  · Squeeze stoma paste around the opening of the skin barrier piece.  · Clean and dry the skin around the stoma again.  · Carefully fit the skin barrier piece over your stoma.  · If you are using a two-piece pouch, snap the pouch onto the skin barrier piece.  · Close the tail of the pouch.  · Put your hand over the top of the skin barrier piece to help warm it for about 5 minutes, so that it conforms to your body better.  · Wash your hands again.  DIET TIPS   · Continue to follow your usual diet.  · Drink about eight 8 oz glasses of water each day.  · You can prevent gas by eating slowly and chewing your food thoroughly.  · If you feel concerned that you have too much gas, you can cut back on gas-producing foods, such as:  ¨ Spicy foods.  ¨ Onions and garlic.  ¨ Cruciferous vegetables (cabbage, broccoli, cauliflower, [place] sprouts).  ¨ Beans and legumes.  ¨ Some cheeses.  ¨ Eggs.  ¨ Fish.  ¨ Bubbly (carbonated) drinks.  ¨ Chewing gum.  GENERAL TIPS   · You can shower with or without the bag  in place.  · Always keep the bag on if you are bathing or swimming.  · If your bag gets wet, you can dry it with a blow-dryer set to cool.  · Avoid wearing tight clothing directly over your stoma so that it does not become irritated or bleed. Tight clothing can also prevent stool from draining into the pouch.  · It is helpful to always have an extra skin barrier and pouch with you when traveling. Do not leave them anywhere too warm, as parts of them can melt.  · Do not let your seat belt rest on your stoma. Try to keep the seat belt either above or below your stoma, or use a tiny pillow to cushion it.  · You can still participate in sports, but you should avoid activities in which there is a risk of getting hit in the abdomen.  · You can still have sex. It is a good idea to empty your pouch prior to sex. Some people and their partners feel very comfortable seeing the pouch during sex. Others choose to wear lingerie or a T-shirt that covers the device.  SEEK IMMEDIATE MEDICAL CARE IF:  · You notice a change in the size or color of the stoma, especially if it becomes very red, purple, black, or pale white.  · You have bloody stools or bleeding from the stoma.  · You have abdominal pain, nausea, vomiting, or bloating.  · There is anything unusual protruding from the stoma.  · You have irritation or red skin around the stoma.  · No stool is passing from the stoma.  · You have diarrhea (requiring more frequent than normal pouch emptying).     This information is not intended to replace advice given to you by your health care provider. Make sure you discuss any questions you have with your health care provider.     Document Released: 12/20/2004 Document Revised: 03/11/2013 Document Reviewed: 05/16/2012  [employer] Interactive Patient Education ©2016 [employer]

## 2018-01-16 NOTE — CARE PLAN
Problem: Safety  Goal: Will remain free from falls  Outcome: PROGRESSING AS EXPECTED    Intervention: Assess risk factors for falls   01/15/18 2000   OTHER   Fall Risk Risk to Fall - 0 - 1 point   Risk for Injury-Any positive answers results in the pt being at high risk for fall related injury Surgery: Thoracic or Abdominal Surgery or Lower Limb Amputation   Mobility Status Assessment 0-Ambulates & Transfers Independently. No Assistance Required   History of fall 0   Pt Calls for Assistance Yes;No assistance required     Intervention: Implement fall precautions   01/15/18 2000   OTHER   Environmental Precautions Treaded Slipper Socks on Patient;Personal Belongings, Wastebasket, [place] etc. in Easy Reach;Report Given to Other Health Care Providers Regarding Fall Risk;Bed in Low Position;Communication Sign for Patients & Families;Mobility Assessed & Appropriate Sign Placed   IV Pole on Same Side of Bed as Bathroom Yes   Bedrails Bedrails Closest to Bathroom Down         Problem: Venous Thromboembolism (VTW)/Deep Vein Thrombosis (DVT) Prevention:  Goal: Patient will participate in Venous Thrombosis (VTE)/Deep Vein Thrombosis (DVT)Prevention Measures  Outcome: PROGRESSING AS EXPECTED   01/15/18 2000   Mechanical/VTE Prophylaxis   Mechanical Prophylaxis  SCDs, Sequential Compression Device   SCDs, Sequential Compression Device On   OTHER   Risk Assessment Score 5   VTE RISK Very High   Pharmacologic Prophylaxis Used Unfractionated Heparin

## 2018-01-16 NOTE — DISCHARGE SUMMARY
CHIEF COMPLAINT ON ADMISSION  diverticular abscess    CODE STATUS  Full Code    HPI & HOSPITAL COURSE  This is a 67 y.o. male here with diverticular abscess.The patient post operative coarse was essentially unremarkable.  At the time of discharge he was afebrile, tolerating a regular diet and voiding normally.  His general exam is unremarkable.  His abdominal incisions are healing satisfactorily.  Patient has been counseled on normal expectations of an uncomplicated post operative coarse.  He was discharged on a regular diet.  He has restarted her pre-admission medications.  His discharge medications are as below.   He is to follow up with Dr. Geiger in one to two weeks and prn.  Discharge instructions were given. The patient is to measure and record all ostomy output.  If greater than 1500cc's in a 24 hr period he is to call for further recommendations. Precautions and limitations were reviewed. The patient was counseled regarding the benefits of narcotics for post-operative pain in the short term period. The patient was made aware of the alternatives, including heating pads, ice, and NSAID medication.    The risks of addiction, overdose, respiratory depression, and risks during pregnancy (if applicable), were discussed.  We discussed taking the medications as prescribed. We discussed other medications the patient is taking, and how these can interact. The patient was notified not to share the medications with others. We discussed warning signs of addiction.    I reviewed the NarcRx  program, and the patient deemed not to be at high risk for abuse, and had no concurrent prescriptions.    The patient understands that these medications are intended to be used in the short term for post-operative pain only.    The patient was given a chance to ask questions, and all her questions were answered. Discussion was undertaken with Layman's terms. The patient demonstrated adequate understanding. Consent was given in clear  state of mind.  Consent was signed.   The patient has stated understanding and agrees with this plan of care.                            Pathological exam:  A. Colon and rectum:         Benign colorectal segment with diverticulitis, perforation, and          abscess formation.         One benign lymph node.  B. Omentum:         Benign omental adipose tissue with no diagnostic abnormality.  C. Proximal colon:         Multiple benign segments of large bowel with diverticulitis.         Largest segment has a transmural defect.         Benign hyperplastic polyp in smallest segment.  D. Terminal ileum:         Benign ileocolonic segment with sessile serrated adenoma.         Benign appendix with fibrous obliteration of the lumen.         Five benign lymph nodes      Therefore, he is discharged in good and stable condition with close outpatient follow-up.    SPECIFIC OUTPATIENT FOLLOW-UP  Dr. Geiger, Outpatient ostomy clinic    DISCHARGE PROBLEM LIST  Active Problems:    Acute postoperative pain of abdomen POA: No    Diarrhea POA: No  Resolved Problems:    * No resolved hospital problems. *      FOLLOW UP  No future appointments.  Nghia Geiger M.D.  [id number]  [address]  [facility] [address]  [phone]    In 1 week        MEDICATIONS ON DISCHARGE   Clinton Harkins   Home Medication Instructions SARINA:[id number]    Printed on:01/16/18 3198   Medication Information                      diphenoxylate-atropine (LOMOTIL) 2.5-0.025 MG Tab  Take 1 Tab by mouth 2 times a day as needed for Diarrhea for up to 15 days.             triamterene/hctz (MAXZIDE-25/DYAZIDE) 37.5-25 MG Cap  Take 1 Cap by mouth every morning.                 DIET  Orders Placed This Encounter   Procedures   • DIET ORDER     Standing Status:   Standing     Number of Occurrences:   1     Order Specific Question:   Diet:     Answer:   Low Fiber(GI Soft) [2]     Order Specific Question:   Miscellaneous modifications:     Answer:   6 Small Meals [4]        ACTIVITY  As tolerated. No lifting greater than 10 lbs for 6 weeks  Weight bearing as tolerated      CONSULTATIONS  Outpatient ostomy clinic    PROCEDURES  robotic assisted total abdominal colectomy with end ileostomy, omentectomy, abdominal cavity abscess washout, flexible sigmoidoscopy -     LABORATORY  Lab Results   Component Value Date/Time    SODIUM 132 (L) 01/16/2018 02:13 AM    POTASSIUM 4.3 01/16/2018 02:13 AM    CHLORIDE 108 01/16/2018 02:13 AM    CO2 19 (L) 01/16/2018 02:13 AM    GLUCOSE 95 01/16/2018 02:13 AM    BUN 14 01/16/2018 02:13 AM    CREATININE 1.22 01/16/2018 02:13 AM        Lab Results   Component Value Date/Time    WBC 7.3 01/16/2018 02:13 AM    HEMOGLOBIN 9.7 (L) 01/16/2018 02:13 AM    HEMATOCRIT 28.9 (L) 01/16/2018 02:13 AM    PLATELETCT 292 01/16/2018 02:13 AM        Total time of the discharge process exceeds 40 minutes

## 2018-02-04 NOTE — OP REPORT
Operative Report    Date: 1/9/2018    Surgeon: Nghia Geiger M.D.    Assistant: Leonor Orellana    Anesthesiologist: Kaylene Bojorquze M.D.    Pre-operative Diagnosis: Diverticular disease     Post-operative Diagnosis: Diverticular disease with mesenteric abscess and arterial insufficiency    Procedure:   1)  ROBOTIC ASSISTED LAPAROSCOPIC CONVERTED TO OPEN TOTAL ABDOMINAL COLECTOMY WITH END ILEOSTOMY   2) omentectomy  3) abdominal cavity washout  4) flexible sigmoidoscopy    Indications: 67-year-old male with long-standing history of diverticulitis desires definitive surgical diagnosis and treatment    An extensive PARQ conference was held with the patient and his family, in regard to indications for elective sigmoid colectomy in the setting of diverticular disease. The patient was made aware of the alternatives, including operative and non-operative: Continued antibiotics with dietary and lifestyle modifications. The risks of bleeding, infection, damage to surrounding structures, need for reoperation, fistula, hernia, leak, permanent or temporary ostomy, stroke, MI, and death were discussed with the patient. The patient was given a chance to ask questions, and all his questions were answered. The patient demonstrates adequate understanding, seems pleased with the plan, and wishes to proceed.    OPERATIVE FINDINGS:    Very long mesenteric abscess involving the mesentery of the entire descending and sigmoid colon  With arterial insufficiency for colorectal anastomosis.  Total abdominal colectomy was performed with an end ileostomy and Bobby's pouch.    Procedure in detail: After informed consent was obtained, the patient was   taken to the operating room, placed in supine position on a pink pad. The patient underwent general endotracheal anesthesia without incident.  The patient's arms were tucked and the chest and shoulders were padded and secured to the operating room table.   The patient was then moved to  lithotomy in yellowfin stirrups with all skin and joint surfaces padded and protected appropriately.The rectum was washed out with Betadine and the abdomen was prepped and draped in a sterile fashion. A timeout was performed verifying the correct patient, procedure, site, positioning in availability of equipment prior to the start of surgery.    Operation was begun by placing a 5 mm periumbilical incision through which a 5 mm trocar was introduced into the abdomen using the Optiview technique. After pneumoperitoneum was achieved, additional trocars were placed, 15 mm in the right lower quadrant and 8 mm in the left upper quadrant. An 8 and a 5 mm assistant port were placed and the camera port was upsized to an 8 mm trocar as well. All trocars were placed with 0.5% Marcaine without epinephrine for local anesthesia.    The patient was positioned in steep Trendelenburg and right lateral decubitus and before the da Armando robotic system was docked the patient was noted to be securely  positioned on the table.  We took down the left lateral attachments of the sigmoid colon, identifying the left ureter which was preserved throughout the remainder of the procedure.  We then opened the retroperitoneal space in the right side and dissected in   the bloodless plane, isolated the FERMÍN pedicle away from the pelvic nerves and   Ureters.  Electrocautery was now used in the presacral space in the bloodless plane.   Dissection was carried down and left and right laterally, freeing up the   rectum. We chose a spot of healthy rectum, divided the mesorectum with a   vessel sealer at this level and then divided the rectum itself with a green   load robotic staple fire.   Upon entry into the mesentery and abscess cavity was identified and very abnormal-looking mesenteric tissue. Copious amounts of purulent discharge was obtained from the mesentery all the way up to the splenic flexure. The FERMÍN was then divided near its origin with a  robotic stapler with a vascular load. We then used electrocautery to mobilize the left colon up to the splenic flexure,  taking down omental attachments and adhesions however due to the very thickened mesentery and mesenteric abscess of the colon could not reach the pelvis without tension for an adequate anastomosis. I then terminated the robotic portion of the procedure removing the ports and converted to an open procedure by incising the skin above the umbilicus to the mid epigastrium and carrying the dissection down through the subcutaneous tissues with electrocautery and then during the abdominal cavity without injury to the underlying organs. I then divided with the left branch of the middle colic and an attempt to get more length however the colon immediately turned dusky as there was not enough collateral flow and the demarcation line was sharp at the proximal right colon. In an attempt to create an ileal tunnel I took down the hepatic flexure and rotated the right colon on its mesentery and again the thickened and shortened mesentery could not reach the pelvis even with an ileal tunnel creation. At this point I was left with no option other than to perform a cecal anastomosis however, I felt that this would be very high risk for leak so I divided the small bowel at the terminal ileum just proximal to the ileocecal valve.  I performed a flexible sigmoidoscopy and found that the rectal stump measured approximately 10 cm but appeared to be markedly inflamed, perhaps from the bowel prep that I felt that a ileorectal anastomosis would require diversion so I proceeded with a ileostomy instead.  I then cut a trephine of skin overlying the right rectus muscle in the right lower quadrant and incised the anterior rectus sheath splitting the rectus muscles atraumatically and incising the posterior rectus sheath. 2 of my fingers easily slipped through and the terminal ileum was brought through this defect.    At this  point numbers of the operating team changed into clean gown and gloves and all of the instruments used in the previous portions of the procedure were moved away from the operating field.  Instruments which had not been previously detached during  The case were now used  For the remainder of the procedure. The midline incision was closed with a #1 running PDS suture. The ileostomy was matured with interrupted 30 Vicryls and the manner of Hosmer.   Skin incision was closed with 4-0 Monocryl. Dermabond was   placed and the patient returned to the PACU in stable condition. All   instruments counts were correct at the end of the procedureThe patient was awakened from general anesthetic, and was taken to the recovery room in stable condition.    Specimen: Terminal ileum and colon    EBL: 800mL    UOP: See anesthetic record    Drains: CARISSA drain    Dispo: PACU    Nghia Geiger MD PhD  [facility]  Colon and Rectal Surgeon  [phone]

## 2018-03-20 NOTE — ADDENDUM NOTE
Encounter addended by: Nghia Geiger M.D. on: 3/20/2018  9:33 AM<BR>    Actions taken: Sign clinical note

## 2019-02-08 ENCOUNTER — HOSPITAL ENCOUNTER (INPATIENT)
Facility: MEDICAL CENTER | Age: 69
LOS: 2 days | DRG: 683 | End: 2019-02-10
Attending: INTERNAL MEDICINE | Admitting: INTERNAL MEDICINE
Payer: MEDICARE

## 2019-02-08 ENCOUNTER — APPOINTMENT (OUTPATIENT)
Dept: RADIOLOGY | Facility: MEDICAL CENTER | Age: 69
DRG: 683 | End: 2019-02-08
Attending: HOSPITALIST
Payer: MEDICARE

## 2019-02-08 ENCOUNTER — APPOINTMENT (OUTPATIENT)
Dept: RADIOLOGY | Facility: MEDICAL CENTER | Age: 69
DRG: 683 | End: 2019-02-08
Attending: NURSE PRACTITIONER
Payer: MEDICARE

## 2019-02-08 PROBLEM — N17.9 ACUTE ON CHRONIC KIDNEY FAILURE (HCC): Status: ACTIVE | Noted: 2019-02-08

## 2019-02-08 PROBLEM — N18.9 ACUTE ON CHRONIC KIDNEY FAILURE (HCC): Status: ACTIVE | Noted: 2019-02-08

## 2019-02-08 PROBLEM — N17.9 AKI (ACUTE KIDNEY INJURY) (HCC): Status: ACTIVE | Noted: 2019-02-08

## 2019-02-08 PROBLEM — E87.20 METABOLIC ACIDOSIS: Status: ACTIVE | Noted: 2019-02-08

## 2019-02-08 PROBLEM — Z93.2 ILEOSTOMY IN PLACE (HCC): Status: ACTIVE | Noted: 2019-02-08

## 2019-02-08 PROBLEM — E66.09 CLASS 1 OBESITY DUE TO EXCESS CALORIES WITHOUT SERIOUS COMORBIDITY WITH BODY MASS INDEX (BMI) OF 32.0 TO 32.9 IN ADULT: Status: ACTIVE | Noted: 2019-02-08

## 2019-02-08 PROBLEM — I10 ESSENTIAL HYPERTENSION: Status: ACTIVE | Noted: 2019-02-08

## 2019-02-08 LAB
25(OH)D3 SERPL-MCNC: 46 NG/ML (ref 30–100)
ALBUMIN SERPL BCP-MCNC: 4 G/DL (ref 3.2–4.9)
ALBUMIN/GLOB SERPL: 1.3 G/DL
ALP SERPL-CCNC: 48 U/L (ref 30–99)
ALT SERPL-CCNC: 15 U/L (ref 2–50)
ANION GAP SERPL CALC-SCNC: 18 MMOL/L (ref 0–11.9)
ANION GAP SERPL CALC-SCNC: 20 MMOL/L (ref 0–11.9)
AST SERPL-CCNC: 9 U/L (ref 12–45)
BILIRUB SERPL-MCNC: 0.6 MG/DL (ref 0.1–1.5)
BUN SERPL-MCNC: 126 MG/DL (ref 8–22)
BUN SERPL-MCNC: 128 MG/DL (ref 8–22)
CA-I SERPL-SCNC: 1.1 MMOL/L (ref 1.1–1.3)
CALCIUM SERPL-MCNC: 7.8 MG/DL (ref 8.5–10.5)
CALCIUM SERPL-MCNC: 7.9 MG/DL (ref 8.5–10.5)
CHLORIDE SERPL-SCNC: 91 MMOL/L (ref 96–112)
CHLORIDE SERPL-SCNC: 95 MMOL/L (ref 96–112)
CO2 SERPL-SCNC: 14 MMOL/L (ref 20–33)
CO2 SERPL-SCNC: 15 MMOL/L (ref 20–33)
CREAT SERPL-MCNC: 11.07 MG/DL (ref 0.5–1.4)
CREAT SERPL-MCNC: 9.57 MG/DL (ref 0.5–1.4)
CREAT UR-MCNC: 130.8 MG/DL
GLOBULIN SER CALC-MCNC: 3.1 G/DL (ref 1.9–3.5)
GLUCOSE SERPL-MCNC: 119 MG/DL (ref 65–99)
GLUCOSE SERPL-MCNC: 88 MG/DL (ref 65–99)
MAGNESIUM SERPL-MCNC: 2.2 MG/DL (ref 1.5–2.5)
OSMOLALITY SERPL: 309 MOSM/KG H2O (ref 278–298)
OSMOLALITY UR: 388 MOSM/KG H2O (ref 300–900)
PHOSPHATE SERPL-MCNC: 7.6 MG/DL (ref 2.5–4.5)
POTASSIUM SERPL-SCNC: 4.4 MMOL/L (ref 3.6–5.5)
POTASSIUM SERPL-SCNC: 4.5 MMOL/L (ref 3.6–5.5)
PROT SERPL-MCNC: 7.1 G/DL (ref 6–8.2)
PTH-INTACT SERPL-MCNC: 550.8 PG/ML (ref 14–72)
SODIUM SERPL-SCNC: 126 MMOL/L (ref 135–145)
SODIUM SERPL-SCNC: 127 MMOL/L (ref 135–145)
SODIUM UR-SCNC: 52 MMOL/L

## 2019-02-08 PROCEDURE — 83930 ASSAY OF BLOOD OSMOLALITY: CPT

## 2019-02-08 PROCEDURE — 700105 HCHG RX REV CODE 258: Performed by: NURSE PRACTITIONER

## 2019-02-08 PROCEDURE — 770006 HCHG ROOM/CARE - MED/SURG/GYN SEMI*

## 2019-02-08 PROCEDURE — 700102 HCHG RX REV CODE 250 W/ 637 OVERRIDE(OP): Performed by: HOSPITALIST

## 2019-02-08 PROCEDURE — 94660 CPAP INITIATION&MGMT: CPT

## 2019-02-08 PROCEDURE — 82330 ASSAY OF CALCIUM: CPT

## 2019-02-08 PROCEDURE — 76775 US EXAM ABDO BACK WALL LIM: CPT

## 2019-02-08 PROCEDURE — 80048 BASIC METABOLIC PNL TOTAL CA: CPT

## 2019-02-08 PROCEDURE — 700111 HCHG RX REV CODE 636 W/ 250 OVERRIDE (IP): Performed by: NURSE PRACTITIONER

## 2019-02-08 PROCEDURE — 83735 ASSAY OF MAGNESIUM: CPT

## 2019-02-08 PROCEDURE — 71045 X-RAY EXAM CHEST 1 VIEW: CPT

## 2019-02-08 PROCEDURE — 99223 1ST HOSP IP/OBS HIGH 75: CPT | Performed by: INTERNAL MEDICINE

## 2019-02-08 PROCEDURE — 84300 ASSAY OF URINE SODIUM: CPT

## 2019-02-08 PROCEDURE — 83935 ASSAY OF URINE OSMOLALITY: CPT

## 2019-02-08 PROCEDURE — 82570 ASSAY OF URINE CREATININE: CPT

## 2019-02-08 PROCEDURE — 700111 HCHG RX REV CODE 636 W/ 250 OVERRIDE (IP): Performed by: HOSPITALIST

## 2019-02-08 PROCEDURE — 82306 VITAMIN D 25 HYDROXY: CPT

## 2019-02-08 PROCEDURE — 99223 1ST HOSP IP/OBS HIGH 75: CPT | Mod: AI | Performed by: HOSPITALIST

## 2019-02-08 PROCEDURE — 700105 HCHG RX REV CODE 258: Performed by: HOSPITALIST

## 2019-02-08 PROCEDURE — 84100 ASSAY OF PHOSPHORUS: CPT

## 2019-02-08 PROCEDURE — 80053 COMPREHEN METABOLIC PANEL: CPT

## 2019-02-08 PROCEDURE — 83970 ASSAY OF PARATHORMONE: CPT

## 2019-02-08 PROCEDURE — A9270 NON-COVERED ITEM OR SERVICE: HCPCS | Performed by: HOSPITALIST

## 2019-02-08 RX ORDER — CALCIUM GLUCONATE 94 MG/ML
1 INJECTION, SOLUTION INTRAVENOUS ONCE
Status: DISCONTINUED | OUTPATIENT
Start: 2019-02-08 | End: 2019-02-08

## 2019-02-08 RX ORDER — ALPRAZOLAM 0.5 MG/1
0.5 TABLET ORAL 2 TIMES DAILY PRN
Status: ON HOLD | COMMUNITY
End: 2019-02-08 | Stop reason: CLARIF

## 2019-02-08 RX ORDER — LISINOPRIL 10 MG/1
10 TABLET ORAL DAILY
Status: DISCONTINUED | OUTPATIENT
Start: 2019-02-08 | End: 2019-02-08

## 2019-02-08 RX ORDER — BUPROPION HYDROCHLORIDE 75 MG/1
150 TABLET ORAL DAILY
COMMUNITY

## 2019-02-08 RX ORDER — BUPROPION HYDROCHLORIDE 75 MG/1
75 TABLET ORAL 2 TIMES DAILY
Status: DISCONTINUED | OUTPATIENT
Start: 2019-02-08 | End: 2019-02-10 | Stop reason: HOSPADM

## 2019-02-08 RX ORDER — POLYETHYLENE GLYCOL 3350 17 G/17G
1 POWDER, FOR SOLUTION ORAL
Status: DISCONTINUED | OUTPATIENT
Start: 2019-02-08 | End: 2019-02-10 | Stop reason: HOSPADM

## 2019-02-08 RX ORDER — HEPARIN SODIUM 5000 [USP'U]/ML
5000 INJECTION, SOLUTION INTRAVENOUS; SUBCUTANEOUS EVERY 8 HOURS
Status: DISCONTINUED | OUTPATIENT
Start: 2019-02-08 | End: 2019-02-10 | Stop reason: HOSPADM

## 2019-02-08 RX ORDER — ONDANSETRON 4 MG/1
4 TABLET, ORALLY DISINTEGRATING ORAL EVERY 4 HOURS PRN
Status: DISCONTINUED | OUTPATIENT
Start: 2019-02-08 | End: 2019-02-10 | Stop reason: HOSPADM

## 2019-02-08 RX ORDER — TRIAMTERENE AND HYDROCHLOROTHIAZIDE 37.5; 25 MG/1; MG/1
1 CAPSULE ORAL EVERY MORNING
Status: DISCONTINUED | OUTPATIENT
Start: 2019-02-08 | End: 2019-02-08

## 2019-02-08 RX ORDER — LISINOPRIL 40 MG/1
40 TABLET ORAL DAILY
COMMUNITY

## 2019-02-08 RX ORDER — SODIUM CHLORIDE 9 MG/ML
INJECTION, SOLUTION INTRAVENOUS CONTINUOUS
Status: DISCONTINUED | OUTPATIENT
Start: 2019-02-08 | End: 2019-02-10 | Stop reason: HOSPADM

## 2019-02-08 RX ORDER — ONDANSETRON 2 MG/ML
4 INJECTION INTRAMUSCULAR; INTRAVENOUS EVERY 4 HOURS PRN
Status: DISCONTINUED | OUTPATIENT
Start: 2019-02-08 | End: 2019-02-10 | Stop reason: HOSPADM

## 2019-02-08 RX ORDER — AMLODIPINE BESYLATE 5 MG/1
5 TABLET ORAL DAILY
Status: DISCONTINUED | OUTPATIENT
Start: 2019-02-08 | End: 2019-02-10 | Stop reason: HOSPADM

## 2019-02-08 RX ORDER — ACETAMINOPHEN 325 MG/1
650 TABLET ORAL EVERY 6 HOURS PRN
Status: DISCONTINUED | OUTPATIENT
Start: 2019-02-08 | End: 2019-02-10 | Stop reason: HOSPADM

## 2019-02-08 RX ORDER — AMLODIPINE BESYLATE 5 MG/1
5 TABLET ORAL DAILY
COMMUNITY

## 2019-02-08 RX ORDER — CITALOPRAM 20 MG/1
40 TABLET ORAL DAILY
Status: DISCONTINUED | OUTPATIENT
Start: 2019-02-08 | End: 2019-02-10 | Stop reason: HOSPADM

## 2019-02-08 RX ORDER — AMOXICILLIN 250 MG
2 CAPSULE ORAL 2 TIMES DAILY
Status: DISCONTINUED | OUTPATIENT
Start: 2019-02-08 | End: 2019-02-10 | Stop reason: HOSPADM

## 2019-02-08 RX ORDER — ALPRAZOLAM 0.5 MG/1
0.5 TABLET ORAL 2 TIMES DAILY
Status: DISCONTINUED | OUTPATIENT
Start: 2019-02-08 | End: 2019-02-10 | Stop reason: HOSPADM

## 2019-02-08 RX ORDER — CITALOPRAM 40 MG/1
40 TABLET ORAL DAILY
COMMUNITY

## 2019-02-08 RX ORDER — BISACODYL 10 MG
10 SUPPOSITORY, RECTAL RECTAL
Status: DISCONTINUED | OUTPATIENT
Start: 2019-02-08 | End: 2019-02-10 | Stop reason: HOSPADM

## 2019-02-08 RX ADMIN — ASPIRIN 81 MG: 81 TABLET, COATED ORAL at 10:28

## 2019-02-08 RX ADMIN — HEPARIN SODIUM 5000 UNITS: 5000 INJECTION, SOLUTION INTRAVENOUS; SUBCUTANEOUS at 15:35

## 2019-02-08 RX ADMIN — ALPRAZOLAM 0.5 MG: 0.5 TABLET ORAL at 21:18

## 2019-02-08 RX ADMIN — BUPROPION HYDROCHLORIDE 75 MG: 75 TABLET, FILM COATED ORAL at 10:36

## 2019-02-08 RX ADMIN — SODIUM CHLORIDE: 9 INJECTION, SOLUTION INTRAVENOUS at 15:37

## 2019-02-08 RX ADMIN — AMLODIPINE BESYLATE 5 MG: 5 TABLET ORAL at 10:28

## 2019-02-08 RX ADMIN — SODIUM CHLORIDE: 9 INJECTION, SOLUTION INTRAVENOUS at 05:53

## 2019-02-08 RX ADMIN — CITALOPRAM HYDROBROMIDE 40 MG: 20 TABLET ORAL at 10:28

## 2019-02-08 RX ADMIN — BUPROPION HYDROCHLORIDE 75 MG: 75 TABLET, FILM COATED ORAL at 21:16

## 2019-02-08 RX ADMIN — CALCIUM GLUCONATE 1 G: 98 INJECTION, SOLUTION INTRAVENOUS at 10:36

## 2019-02-08 RX ADMIN — HEPARIN SODIUM 5000 UNITS: 5000 INJECTION, SOLUTION INTRAVENOUS; SUBCUTANEOUS at 21:15

## 2019-02-08 ASSESSMENT — LIFESTYLE VARIABLES
CONSUMPTION TOTAL: NEGATIVE
TOTAL SCORE: 0
ON A TYPICAL DAY WHEN YOU DRINK ALCOHOL HOW MANY DRINKS DO YOU HAVE: 1
AVERAGE NUMBER OF DAYS PER WEEK YOU HAVE A DRINK CONTAINING ALCOHOL: 7
HAVE PEOPLE ANNOYED YOU BY CRITICIZING YOUR DRINKING: NO
TOTAL SCORE: 0
EVER HAD A DRINK FIRST THING IN THE MORNING TO STEADY YOUR NERVES TO GET RID OF A HANGOVER: NO
HOW MANY TIMES IN THE PAST YEAR HAVE YOU HAD 5 OR MORE DRINKS IN A DAY: 0
EVER_SMOKED: NEVER
EVER FELT BAD OR GUILTY ABOUT YOUR DRINKING: NO
ALCOHOL_USE: YES
HAVE YOU EVER FELT YOU SHOULD CUT DOWN ON YOUR DRINKING: NO
TOTAL SCORE: 0

## 2019-02-08 ASSESSMENT — PATIENT HEALTH QUESTIONNAIRE - PHQ9
2. FEELING DOWN, DEPRESSED, IRRITABLE, OR HOPELESS: NOT AT ALL
SUM OF ALL RESPONSES TO PHQ9 QUESTIONS 1 AND 2: 0
1. LITTLE INTEREST OR PLEASURE IN DOING THINGS: NOT AT ALL

## 2019-02-08 ASSESSMENT — ENCOUNTER SYMPTOMS
DIARRHEA: 1
CARDIOVASCULAR NEGATIVE: 1
VOMITING: 0
MUSCULOSKELETAL NEGATIVE: 1
RESPIRATORY NEGATIVE: 1
NAUSEA: 0
ABDOMINAL PAIN: 1
PSYCHIATRIC NEGATIVE: 1
CONSTITUTIONAL NEGATIVE: 1
NEUROLOGICAL NEGATIVE: 1
EYES NEGATIVE: 1

## 2019-02-08 ASSESSMENT — COGNITIVE AND FUNCTIONAL STATUS - GENERAL
HELP NEEDED FOR BATHING: A LITTLE
MOVING TO AND FROM BED TO CHAIR: A LITTLE
MOBILITY SCORE: 19
TURNING FROM BACK TO SIDE WHILE IN FLAT BAD: A LITTLE
MOVING FROM LYING ON BACK TO SITTING ON SIDE OF FLAT BED: A LITTLE
CLIMB 3 TO 5 STEPS WITH RAILING: A LITTLE
STANDING UP FROM CHAIR USING ARMS: A LITTLE
DRESSING REGULAR LOWER BODY CLOTHING: A LITTLE
SUGGESTED CMS G CODE MODIFIER DAILY ACTIVITY: CJ
SUGGESTED CMS G CODE MODIFIER MOBILITY: CK
DAILY ACTIVITIY SCORE: 22

## 2019-02-08 NOTE — CONSULTS
DATE OF SERVICE:  02/08/2019    REQUESTING PHYSICIAN:  Landry Hemphill MD    REASON FOR CONSULTATION:  Management of acute kidney injury.    Patient seen and examined, medical record reviewed.    HISTORY OF PRESENT ILLNESS:  The patient is a very pleasant 68-year-old   gentleman with past medical history significant for colon disease, status post   partial colon resection and ileostomy placement, patient presented to an   outside hospital with high ileostomy output, decreased p.o. intake and   hypotension.  Patient developed acute kidney injury, which did not improve   with IV fluid.  Patient eventually was transferred to [facility] [place]   for further workup and management.    Patient is doing better, and no more nausea, vomiting, no hematuria, no   dysuria, no recent use of NSAIDs or IV contrast.    Patient is not sure what his baseline kidney function, he thinks his   creatinine baseline is around 1.4.    PAST MEDICAL HISTORY:  Significant for:  1.  Hypertension.    2.  Bowel disease.  3.  Sleep apnea.    ALLERGIES:  The list was reviewed.    SOCIAL HISTORY:  Patient has no history of smoking.    FAMILY HISTORY:  Positive for coronary artery disease.    MEDICATIONS:  Reviewed.    REVIEW OF SYSTEMS:  Patient is nervous, other review of systems is negative   except outlined in the history of present illness.      PHYSICAL EXAMINATION:  GENERAL:  Patient is in no apparent distress.  VITAL SIGNS:  Showed blood pressure of 130/62, heart rate was 94, respiratory   rate 17.  HEENT:  Normocephalic, atraumatic.  Sclerae is anicteric.  Pupils are   reactive.  Nose is normal.  Mucous membranes moist.  NECK:  No lymphadenopathy, no JVD, no thyroid mass.  CHEST:  Normal.  LUNGS:  Clear to auscultation.  HEART:  S1, S2.  ABDOMEN:  Soft, nontender.  No hepatosplenomegaly.  Patient had an ileostomy   bag.  EXTREMITIES:  There is no lower extremity edema.  SKIN:  No skin rashes.  NEUROLOGIC:  Patient is alert and oriented  x3.    LABORATORY DATA:  His recent labs were reviewed, showed sodium 1.9, carbon   dioxide 15, and creatinine 11.0.  Patient had chest x-ray done earlier today,   which I reviewed the image showed no pulmonary edema.      ASSESSMENT:  1.  Acute kidney injury.  This is most likely volume depletion.  There is a   possibility of acute tubular necrosis.  2.  Anemia.  3.  Hyponatremia.  4.  Metabolic acidosis.    PLAN:    1.  There is no acute need for renal replacement therapy.    2.  Continue fluid resuscitation.    3.  Renal dose all medications    4.  Renal diet.    5.  Daily labs.  6.  If there is no improvement in the creatinine in the next 24 to 48 hours,   we will consider dialysis.    7.  Prognosis is guarded.      Plan discussed in detail with the primary team MD.       ____________________________________     FADI NAJJAR, MD FN / NTS    DD:  02/08/2019 14:28:37  DT:  02/08/2019 14:43:42    D#:  [id number]  Job#:  [id number]

## 2019-02-08 NOTE — PROGRESS NOTES
Critical result called to RN from Valeria George this morning at 0807 regarding BUN/Cr. TORY Cisse, notified in person at 0810 regarding these results. New orders in place.

## 2019-02-08 NOTE — PROGRESS NOTES
Attempted to complete medication reconciliation with pt upon arrival. Pt unclear about exact dosages and times he takes each medication. Per pt his wife will be awake around 07:, she will be at the bedside around noon. Please call to confirm home medications so they can be administered.

## 2019-02-08 NOTE — DIETARY
"Nutrition services: Day 0 of admit.  Clinton Harkins is a 68 y.o. male with admitting DX of Acute Kidney Injury.    Poor PO noted on admit screen.  Spoke with pt at bedside.  Pt appeared well nourished.  Pt reports poor PO recently d/t not feeling well. Pt denies any nausea.  Lunch tray observed at bedside was <25% consumed.  Discussed meal/snack options with pt and will add preferences to daily menu. Pt denied receiving supplements at this time.  Pt states no recent wt loss.     Assessment:  Height: 185.4 cm (6' 1\")  Weight: 111 kg (244 lb 11.4 oz)  Body mass index is 32.29 kg/m².   Diet/Intake: Renal; no PO recorded in chart yet to assess intake    Malnutrition Risk: No risk identified at this time    Recommendations/Plan:  1. Encourage intake of meals and snacks.  2. Document intake of all meals and snacks as % taken in ADLs to provide interdisciplinary communication across all shifts.   3. Monitor weight.  4. Nutrition rep will continue to see patient for ongoing meal and snack preferences.           "

## 2019-02-08 NOTE — PROGRESS NOTES
2 RN skin check completed with YAKOV Pandya. Colostomy to right lower quadrant. Heels dry and flaky, no s/s of skin breakdown noted.

## 2019-02-08 NOTE — CARE PLAN
Problem: Nutritional:  Goal: Achieve adequate nutritional intake  Patient will consume 50% of meals  Outcome: PROGRESSING SLOWER THAN EXPECTED  PO for one meal <25%.

## 2019-02-08 NOTE — PROGRESS NOTES
Patient admitted earlier this morning and transferred to the neurology floor.  Patient seen and examined.  CMP reviewed and shows acute kidney injury with creatinine over 11, , potassium 4.5, and sodium 126.  I have contacted nephrology regarding laboratory results this morning.  Patient was seen by nephrology who feels the primary source of his acute kidney injury is prerenal due to dehydration from her recent GI illness.  He is recommended against placement of a hemodialysis catheter or hemodialysis given the patient's clinical exam and laboratory results.  He recommends IV hydration as the patient is currently making urine.  Follow-up CMP tomorrow.  If the patient has decreased creatinine he can be discharged from a nephrology point of view.  Existing labs, including magnesium, phosphorus, PTH, serum and urine osmolality, and vitamin D pending.  On clinical exam the patient appears euvolemic.  He is in no acute distress and is nonfocal neurologically.  We will check postvoid residual to ensure there is no evidence of hydronephrosis or post renal failure.

## 2019-02-08 NOTE — PROGRESS NOTES
Direct admit from [facility], referred by Dr. Wills. For IVONNE. Admitting MD is Dr. Lerma. Dr. Santoyo has agreed to consult. ADT order signed and held, to be released upon patient's arrival on the unit. Patient arriving via Fixed wing transport.

## 2019-02-08 NOTE — CARE PLAN
Problem: Communication  Goal: The ability to communicate needs accurately and effectively will improve  Outcome: PROGRESSING AS EXPECTED  Pt oriented to room and call light system. Provided welcome folder and pad of paper to write down any questions.     Problem: Safety  Goal: Will remain free from injury  Outcome: PROGRESSING AS EXPECTED  Bed locked and in lowest position. Call light and personal belongings in reach. Bed alarm in place. Hourly rounding.

## 2019-02-08 NOTE — H&P
Hospital Medicine History & Physical Note    Date of Service  2/8/2019    Primary Care Physician  Avelino Davis M.D.    Consultants  None    Code Status  Full code     Chief Complaint  Diarrhea     History of Presenting Illness  68 y.o. Male with history of colon disease status post resection with ileostomy placement, essential hypertension which is controlled, and chronic kidney disease for which he was not being closely followed by a nephrologist, was in his usual state of health until approximately 5 days prior to admission, at this point he began to have stomach upset, associated with increased output from his ileostomy.  He reports changing the ileostomy bag approximately 7-8 times a day, when usually he does not less than 5 times.  He began to feel somewhat weak, and he presented to an outside hospital.  At that time, he was found to have acute kidney failure, and due to need of nephrology he was transferred to this facility.  He currently denies any headache or vision changes, he has no chest pain or shortness of breath, no pain with urination.    Review of Systems  Review of Systems   Constitutional: Negative.    HENT: Negative.    Eyes: Negative.    Respiratory: Negative.    Cardiovascular: Negative.    Gastrointestinal: Positive for abdominal pain and diarrhea. Negative for nausea and vomiting.   Genitourinary: Negative.    Musculoskeletal: Negative.    Skin: Negative.    Neurological: Negative.    Endo/Heme/Allergies: Negative.    Psychiatric/Behavioral: Negative.        Past Medical History   has a past medical history of Bowel habit changes; Hypertension; Psychiatric problem; and Sleep apnea.    Surgical History   has a past surgical history that includes hip replacement, total (Right, 2015); hip replacement, total (Left, 2011); other (1985 approx); and low anterior resection robotic xi (1/9/2018).     Family History  family history includes Cancer in his father; Heart Disease in his mother; Lung  Disease in his father.     Social History   reports that he has never smoked. He has never used smokeless tobacco. He reports that he drinks alcohol. He reports that he does not use drugs.    Allergies  Allergies   Allergen Reactions   • Shellfish Allergy Anaphylaxis   • Levaquin      Achilles tendon gets sore       Medications  Prior to Admission Medications   Prescriptions Last Dose Informant Patient Reported? Taking?   ALPRAZolam (XANAX) 0.5 MG Tab   Yes Yes   Sig: Take 0.5 mg by mouth 2 Times a Day.   amLODIPine (NORVASC) 5 MG Tab   Yes Yes   Sig: Take 5 mg by mouth every day.   aspirin EC (ECOTRIN) 81 MG Tablet Delayed Response   Yes Yes   Sig: Take 81 mg by mouth every day.   buPROPion (WELLBUTRIN) 75 MG Tab   Yes Yes   Sig: Take 75 mg by mouth 2 times a day.   citalopram (CELEXA) 40 MG Tab   Yes Yes   Sig: Take 40 mg by mouth every day.   lisinopril (PRINIVIL) 10 MG Tab   Yes Yes   Sig: Take 10 mg by mouth every day.   triamterene/hctz (MAXZIDE-25/DYAZIDE) 37.5-25 MG Cap   Yes No   Sig: Take 1 Cap by mouth every morning.      Facility-Administered Medications: None       Physical Exam  Temp:  [36.3 °C (97.4 °F)] 36.3 °C (97.4 °F)  Pulse:  [95] 95  Resp:  [16] 16  BP: (113)/(66) 113/66  SpO2:  [94 %] 94 %    Physical Exam   Constitutional: He is oriented to person, place, and time. He appears well-developed and well-nourished. No distress.   HENT:   Head: Normocephalic and atraumatic.   Eyes: Pupils are equal, round, and reactive to light. Conjunctivae are normal.   Neck: Normal range of motion. Neck supple. No tracheal deviation present. No thyromegaly present.   Cardiovascular: Normal rate, regular rhythm and normal heart sounds.  Exam reveals no gallop and no friction rub.    No murmur heard.  Pulmonary/Chest: Effort normal and breath sounds normal. No respiratory distress. He has no wheezes.   Abdominal: Soft. Bowel sounds are normal. He exhibits no distension and no mass. There is tenderness. There is no  rebound and no guarding.   Ileostomy   Musculoskeletal: Normal range of motion. He exhibits no edema.   Lymphadenopathy:     He has no cervical adenopathy.   Neurological: He is alert and oriented to person, place, and time. No cranial nerve deficit.   Skin: He is not diaphoretic.   Nursing note and vitals reviewed.      Laboratory:  From outside facility show sodium 124, potassium 5.8, chloride 88, bicarbonate 14, glucose 129, , creatinine 11.15, AST 22, ALT 40, lipase 507, white blood cells 20.32, hemoglobin 20, platelets 396        Imaging:  CT of the abdomen and pelvis with no acute intra-abdominal pathology, ileostomy noted, mild pancreatic atrophy and old compression fractures of T12 L2 and L3      Assessment/Plan:  I anticipate this patient will require at least two midnights for appropriate medical management, necessitating inpatient admission.    * Acute on chronic kidney failure (HCC)   Assessment & Plan    Unclear chronicity.  Patient reports that he has seen a specialist for the same.  Suspect at least component of pre-renal disease given recent gastrointestinal illness and massive ileostomy output.  Continue hydration, check urine studies and repeat CMP     Class 1 obesity due to excess calories without serious comorbidity with body mass index (BMI) of 32.0 to 32.9 in adult   Assessment & Plan    Body mass index is 32.29 kg/m².       Metabolic acidosis   Assessment & Plan    Likely due to renal failure.  Monitor with hydration.      Essential hypertension   Assessment & Plan    Controlled with current medication regimen     Ileostomy in place (HCC)   Assessment & Plan    Post colectomy.  Ileostomy care      Diarrhea- (present on admission)   Assessment & Plan    Likely due to viral illness.          VTE prophylaxis: SCD, heparin

## 2019-02-08 NOTE — PROGRESS NOTES
Med rec complete per pt home pharmacy  Allergies have been verified and updated  No oral ABX within the last 30 days  Pt Home Pharmacy:[facility]

## 2019-02-09 ENCOUNTER — APPOINTMENT (OUTPATIENT)
Dept: RADIOLOGY | Facility: MEDICAL CENTER | Age: 69
DRG: 683 | End: 2019-02-09
Attending: HOSPITALIST
Payer: MEDICARE

## 2019-02-09 PROBLEM — E83.51 HYPOCALCEMIA: Status: ACTIVE | Noted: 2019-02-09

## 2019-02-09 LAB
ALBUMIN SERPL BCP-MCNC: 3.6 G/DL (ref 3.2–4.9)
ALBUMIN/GLOB SERPL: 1.6 G/DL
ALP SERPL-CCNC: 42 U/L (ref 30–99)
ALT SERPL-CCNC: 11 U/L (ref 2–50)
ANION GAP SERPL CALC-SCNC: 15 MMOL/L (ref 0–11.9)
ANION GAP SERPL CALC-SCNC: 15 MMOL/L (ref 0–11.9)
AST SERPL-CCNC: 7 U/L (ref 12–45)
BASOPHILS # BLD AUTO: 0.2 % (ref 0–1.8)
BASOPHILS # BLD: 0.02 K/UL (ref 0–0.12)
BILIRUB SERPL-MCNC: 0.6 MG/DL (ref 0.1–1.5)
BUN SERPL-MCNC: 118 MG/DL (ref 8–22)
BUN SERPL-MCNC: 126 MG/DL (ref 8–22)
CALCIUM SERPL-MCNC: 7.6 MG/DL (ref 8.5–10.5)
CALCIUM SERPL-MCNC: 8.1 MG/DL (ref 8.5–10.5)
CHLORIDE SERPL-SCNC: 101 MMOL/L (ref 96–112)
CHLORIDE SERPL-SCNC: 97 MMOL/L (ref 96–112)
CO2 SERPL-SCNC: 15 MMOL/L (ref 20–33)
CO2 SERPL-SCNC: 15 MMOL/L (ref 20–33)
CREAT SERPL-MCNC: 7.24 MG/DL (ref 0.5–1.4)
CREAT SERPL-MCNC: 8.39 MG/DL (ref 0.5–1.4)
EOSINOPHIL # BLD AUTO: 0 K/UL (ref 0–0.51)
EOSINOPHIL NFR BLD: 0 % (ref 0–6.9)
ERYTHROCYTE [DISTWIDTH] IN BLOOD BY AUTOMATED COUNT: 49.9 FL (ref 35.9–50)
GLOBULIN SER CALC-MCNC: 2.3 G/DL (ref 1.9–3.5)
GLUCOSE SERPL-MCNC: 105 MG/DL (ref 65–99)
GLUCOSE SERPL-MCNC: 90 MG/DL (ref 65–99)
HCT VFR BLD AUTO: 47.6 % (ref 42–52)
HGB BLD-MCNC: 16.6 G/DL (ref 14–18)
IMM GRANULOCYTES # BLD AUTO: 0.12 K/UL (ref 0–0.11)
IMM GRANULOCYTES NFR BLD AUTO: 1 % (ref 0–0.9)
LYMPHOCYTES # BLD AUTO: 0.51 K/UL (ref 1–4.8)
LYMPHOCYTES NFR BLD: 4.2 % (ref 22–41)
MCH RBC QN AUTO: 31.5 PG (ref 27–33)
MCHC RBC AUTO-ENTMCNC: 34.9 G/DL (ref 33.7–35.3)
MCV RBC AUTO: 90.3 FL (ref 81.4–97.8)
MONOCYTES # BLD AUTO: 1.39 K/UL (ref 0–0.85)
MONOCYTES NFR BLD AUTO: 11.4 % (ref 0–13.4)
NEUTROPHILS # BLD AUTO: 10.13 K/UL (ref 1.82–7.42)
NEUTROPHILS NFR BLD: 83.2 % (ref 44–72)
NRBC # BLD AUTO: 0 K/UL
NRBC BLD-RTO: 0 /100 WBC
PLATELET # BLD AUTO: 235 K/UL (ref 164–446)
PMV BLD AUTO: 8.6 FL (ref 9–12.9)
POTASSIUM SERPL-SCNC: 4.3 MMOL/L (ref 3.6–5.5)
POTASSIUM SERPL-SCNC: 4.7 MMOL/L (ref 3.6–5.5)
PROT SERPL-MCNC: 5.9 G/DL (ref 6–8.2)
RBC # BLD AUTO: 5.27 M/UL (ref 4.7–6.1)
SODIUM SERPL-SCNC: 127 MMOL/L (ref 135–145)
SODIUM SERPL-SCNC: 131 MMOL/L (ref 135–145)
WBC # BLD AUTO: 12.2 K/UL (ref 4.8–10.8)

## 2019-02-09 PROCEDURE — 94660 CPAP INITIATION&MGMT: CPT

## 2019-02-09 PROCEDURE — 700111 HCHG RX REV CODE 636 W/ 250 OVERRIDE (IP): Performed by: HOSPITALIST

## 2019-02-09 PROCEDURE — A9270 NON-COVERED ITEM OR SERVICE: HCPCS | Performed by: HOSPITALIST

## 2019-02-09 PROCEDURE — 700102 HCHG RX REV CODE 250 W/ 637 OVERRIDE(OP): Performed by: NURSE PRACTITIONER

## 2019-02-09 PROCEDURE — 770006 HCHG ROOM/CARE - MED/SURG/GYN SEMI*

## 2019-02-09 PROCEDURE — 80053 COMPREHEN METABOLIC PANEL: CPT

## 2019-02-09 PROCEDURE — 99232 SBSQ HOSP IP/OBS MODERATE 35: CPT | Performed by: HOSPITALIST

## 2019-02-09 PROCEDURE — 700102 HCHG RX REV CODE 250 W/ 637 OVERRIDE(OP): Performed by: HOSPITALIST

## 2019-02-09 PROCEDURE — A9270 NON-COVERED ITEM OR SERVICE: HCPCS | Performed by: NURSE PRACTITIONER

## 2019-02-09 PROCEDURE — 85025 COMPLETE CBC W/AUTO DIFF WBC: CPT

## 2019-02-09 PROCEDURE — 99232 SBSQ HOSP IP/OBS MODERATE 35: CPT | Performed by: INTERNAL MEDICINE

## 2019-02-09 PROCEDURE — 80048 BASIC METABOLIC PNL TOTAL CA: CPT

## 2019-02-09 RX ORDER — OMEPRAZOLE 20 MG/1
20 CAPSULE, DELAYED RELEASE ORAL DAILY
Status: DISCONTINUED | OUTPATIENT
Start: 2019-02-09 | End: 2019-02-10 | Stop reason: HOSPADM

## 2019-02-09 RX ORDER — SUCRALFATE 1 G/1
1 TABLET ORAL
Status: DISCONTINUED | OUTPATIENT
Start: 2019-02-09 | End: 2019-02-10 | Stop reason: HOSPADM

## 2019-02-09 RX ADMIN — SUCRALFATE 1 G: 1 TABLET ORAL at 17:19

## 2019-02-09 RX ADMIN — HEPARIN SODIUM 5000 UNITS: 5000 INJECTION, SOLUTION INTRAVENOUS; SUBCUTANEOUS at 14:33

## 2019-02-09 RX ADMIN — ONDANSETRON 4 MG: 2 INJECTION INTRAMUSCULAR; INTRAVENOUS at 14:06

## 2019-02-09 RX ADMIN — OMEPRAZOLE 20 MG: 20 CAPSULE, DELAYED RELEASE ORAL at 09:15

## 2019-02-09 RX ADMIN — ALPRAZOLAM 0.5 MG: 0.5 TABLET ORAL at 22:25

## 2019-02-09 RX ADMIN — CITALOPRAM HYDROBROMIDE 40 MG: 20 TABLET ORAL at 05:52

## 2019-02-09 RX ADMIN — SUCRALFATE 1 G: 1 TABLET ORAL at 21:00

## 2019-02-09 RX ADMIN — ASPIRIN 81 MG: 81 TABLET, COATED ORAL at 05:53

## 2019-02-09 RX ADMIN — SUCRALFATE 1 G: 1 TABLET ORAL at 12:22

## 2019-02-09 RX ADMIN — HEPARIN SODIUM 5000 UNITS: 5000 INJECTION, SOLUTION INTRAVENOUS; SUBCUTANEOUS at 05:51

## 2019-02-09 RX ADMIN — BUPROPION HYDROCHLORIDE 75 MG: 75 TABLET, FILM COATED ORAL at 05:52

## 2019-02-09 RX ADMIN — BUPROPION HYDROCHLORIDE 75 MG: 75 TABLET, FILM COATED ORAL at 17:14

## 2019-02-09 RX ADMIN — AMLODIPINE BESYLATE 5 MG: 5 TABLET ORAL at 05:52

## 2019-02-09 RX ADMIN — ALPRAZOLAM 0.5 MG: 0.5 TABLET ORAL at 09:15

## 2019-02-09 ASSESSMENT — ENCOUNTER SYMPTOMS
COUGH: 0
WEIGHT LOSS: 0
CHILLS: 0
ABDOMINAL PAIN: 0
DIARRHEA: 0
HEARTBURN: 1
SHORTNESS OF BREATH: 0
FEVER: 0
CONSTIPATION: 0
BLOOD IN STOOL: 0
NECK PAIN: 0
NAUSEA: 0
MYALGIAS: 0
FLANK PAIN: 0
VOMITING: 0

## 2019-02-09 ASSESSMENT — PATIENT HEALTH QUESTIONNAIRE - PHQ9
1. LITTLE INTEREST OR PLEASURE IN DOING THINGS: NOT AT ALL
SUM OF ALL RESPONSES TO PHQ9 QUESTIONS 1 AND 2: 0
2. FEELING DOWN, DEPRESSED, IRRITABLE, OR HOPELESS: NOT AT ALL

## 2019-02-09 NOTE — PROGRESS NOTES
"[id number]    No acute events this shift. Pt is A&Ox4, calm, cooperative. VSS on RA. After report from previous shift this AM, pt still had a Hoffmann catheter and stated he needed to pee. Pt stood up at the EOB to help drain Hoffmann better and voided around the catheter onto the floor. Hoffmann was removed and pt has been voiding clear yellow urine in urinal at bedside throughout shift. CMS intact, +tremors to BUE (pt stated \"this started happening after I got that stomach flu\"). HRR, lungs diminished. Pt has a RLQ ileostomy that he manages himself - draining liquid dark brown stool. Hypoactive BS. Skin intact. IVF maintained. Left subclavian triple lumen central line in place from previous hospital - chest xray confirmed placement. RN attempted peripheral access x2 but was unable to obtain access due to dehydration. Central line dressing changed this evening - dressing had some serosang drainage present at site. Pt is a SBA OOB. Pt stated he didn't get good rest since arriving at hospital in [place] yesterday and was sleeping between care throughout shift. Patient's wife, Abril, updated over the phone about POC with patient's permission and arrived at pt's bedside this evening. No other needs noted at this time. Hourly rounding completed. Bed locked and in low position, side rails up x2, call light within reach, bed alarm on.         "

## 2019-02-09 NOTE — PROGRESS NOTES
Pt. CARMENZA NAIR. Pain controlled. Pt. Caring for colostomy. Will attempt to obtain a peripheral IV and D/C central line per MD request. IV fluids mainained.  Will continue to monitor.

## 2019-02-09 NOTE — PROGRESS NOTES
Nephrology Daily Progress Note    Date of Service  2/9/2019    Chief Complaint  68 y.o. male admitted 2/8/2019 with diarrhea, IVONNE    Interval Problem Update  Pt ois doing better, no N/V    Review of Systems  Review of Systems   Constitutional: Negative for chills, fever and malaise/fatigue.   Respiratory: Negative for cough and shortness of breath.    Cardiovascular: Negative for chest pain and leg swelling.   Gastrointestinal: Negative for nausea and vomiting.   Genitourinary: Negative for dysuria, frequency and urgency.        Physical Exam  Temp:  [36.2 °C (97.1 °F)-36.7 °C (98 °F)] 36.7 °C (98 °F)  Pulse:  [82-96] 96  Resp:  [16-18] 17  BP: (112-133)/(61-68) 112/63  SpO2:  [95 %-98 %] 98 %    Physical Exam   Constitutional: He is oriented to person, place, and time. No distress.   HENT:   Nose: Nose normal.   Eyes: No scleral icterus.   Cardiovascular: Normal rate and regular rhythm.    No murmur heard.  Pulmonary/Chest: Effort normal and breath sounds normal. No respiratory distress. He has no wheezes.   Musculoskeletal: He exhibits no edema.   Neurological: He is alert and oriented to person, place, and time.   Skin: He is not diaphoretic.   Nursing note and vitals reviewed.      Fluids    Intake/Output Summary (Last 24 hours) at 02/09/19 1316  Last data filed at 02/09/19 0600   Gross per 24 hour   Intake                0 ml   Output             1375 ml   Net            -1375 ml       Laboratory  Recent Labs      02/09/19   0600   WBC  12.2*   RBC  5.27   HEMOGLOBIN  16.6   HEMATOCRIT  47.6   MCV  90.3   MCH  31.5   MCHC  34.9   RDW  49.9   PLATELETCT  235   MPV  8.6*     Recent Labs      02/08/19   1708  02/09/19   0015  02/09/19   0600   SODIUM  127*  127*  131*   POTASSIUM  4.4  4.7  4.3   CHLORIDE  95*  97  101   CO2  14*  15*  15*   GLUCOSE  119*  90  105*   BUN  128*  126*  118*   CREATININE  9.57*  8.39*  7.24*   CALCIUM  7.9*  8.1*  7.6*                   Imaging    Assessment/Plan  1 IVONNE: sec to  volume depletion, cr is better  2 hyponatremia: better  Plan  no need for HD  Continue IVF  Daily labs  Encourage Po intake  Renal dose all meds  Avoid nephrotoxins  D/W Camilo DUNHAM.

## 2019-02-09 NOTE — CARE PLAN
Problem: Venous Thromboembolism (VTW)/Deep Vein Thrombosis (DVT) Prevention:  Goal: Patient will participate in Venous Thrombosis (VTE)/Deep Vein Thrombosis (DVT)Prevention Measures  SCD in place. Leg perfusion assessed.

## 2019-02-09 NOTE — PROGRESS NOTES
Hospital Medicine Daily Progress Note    Date of Service  2/9/2019    Chief Complaint  68 y.o. male admitted 2/8/2019 with acute kidney injury secondary to diffuse watery diarrhea and vomiting from suspected GI illness.    Hospital Course    Patient transferred from [place] due to acute kidney injury stemming from a recent GI illness with profuse watery discharge out of his ileostomy and uncontrolled emesis.  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  See previous records.  Emesis has stopped and output from his ileostomy has markedly slowed.  Nephrology consult suggest likely cause is prerenal.  Given patient's urine output and improvement with IV fluids alone, likely to not require dialysis and can discharge when medically cleared.      Interval Problem Update  Acute kidney injury--resolving; labs trending toward normal.  Esophagitis\gastritis--patient still with some degree of intolerance to p.o. intake given pain\burning with meals.  Started PPI and Carafate.  Will need to DC the latter due to renal complications.  Ileostomy--output has been markedly lower; follow  Nonspecific gastroenteritis-- no diarrhea or emesis since admission; follow    Consultants/Specialty  Nephrology    Code Status  Full    Disposition  Home-- wife is at bedside; lives in [place], which could be difficult given whether; I advised staying in a hotel if need be as patient bed will be needed    Review of Systems  Review of Systems   Constitutional: Negative for chills, fever and weight loss.   Gastrointestinal: Positive for heartburn. Negative for abdominal pain, blood in stool, constipation, diarrhea, melena, nausea and vomiting.   Genitourinary: Negative for dysuria, flank pain, frequency, hematuria and urgency.   Musculoskeletal: Negative for myalgias and neck pain.   All other systems reviewed and are negative.       Physical Exam  Temp:  [36.2 °C (97.1 °F)-36.7 °C (98 °F)] 36.7 °C  (98 °F)  Pulse:  [82-96] 96  Resp:  [16-18] 17  BP: (112-133)/(61-68) 112/63  SpO2:  [95 %-98 %] 98 %    Physical Exam   Constitutional: He is oriented to person, place, and time. He appears well-developed and well-nourished. No distress.   HENT:   Head: Normocephalic and atraumatic.   Eyes: Pupils are equal, round, and reactive to light. EOM are normal.   Neck: Neck supple.   Cardiovascular: Normal rate, regular rhythm and normal heart sounds.    Pulmonary/Chest: Effort normal and breath sounds normal. No respiratory distress. He has no wheezes. He has no rales.   Abdominal: Soft. He exhibits no distension. There is no tenderness.       Neurological: He is alert and oriented to person, place, and time. No cranial nerve deficit.   Skin: Skin is warm and dry.       Fluids    Intake/Output Summary (Last 24 hours) at 02/09/19 1401  Last data filed at 02/09/19 0600   Gross per 24 hour   Intake                0 ml   Output             1375 ml   Net            -1375 ml       Laboratory  Recent Labs      02/09/19   0600   WBC  12.2*   RBC  5.27   HEMOGLOBIN  16.6   HEMATOCRIT  47.6   MCV  90.3   MCH  31.5   MCHC  34.9   RDW  49.9   PLATELETCT  235   MPV  8.6*     Recent Labs      02/08/19   1708  02/09/19   0015  02/09/19   0600   SODIUM  127*  127*  131*   POTASSIUM  4.4  4.7  4.3   CHLORIDE  95*  97  101   CO2  14*  15*  15*   GLUCOSE  119*  90  105*   BUN  128*  126*  118*   CREATININE  9.57*  8.39*  7.24*   CALCIUM  7.9*  8.1*  7.6*                   Imaging  IR-US GUIDED PIV   Final Result    Ultrasound-guided PERIPHERAL IV INSERTION performed by    qualified nursing staff as above.            US-RENAL   Final Result      1.  No hydronephrosis or solid renal mass.      2.  Bilateral symmetric renal cortical thinning.      DX-CHEST-PORTABLE (1 VIEW)   Final Result      1.  Minimal bilateral interstitial opacities, suggesting mild pulmonary edema.   2.  Left central venous line in place, with no pneumothorax.            Assessment/Plan  * Acute on chronic kidney failure (HCC)   Assessment & Plan    Unclear chronicity.  Patient reports that he has seen a specialist for the same.  Suspect at least component of pre-renal disease given recent gastrointestinal illness and massive ileostomy output.  Continue hydration, check urine studies and repeat CMP     Class 1 obesity due to excess calories without serious comorbidity with body mass index (BMI) of 32.0 to 32.9 in adult   Assessment & Plan    Body mass index is 32.29 kg/m².       Metabolic acidosis   Assessment & Plan    Likely due to renal failure.  Monitor with hydration.      Essential hypertension   Assessment & Plan    Controlled with current medication regimen     Ileostomy in place (HCC)   Assessment & Plan    Post colectomy.  Ileostomy care      Diarrhea- (present on admission)   Assessment & Plan    Likely due to viral illness.           VTE prophylaxis: Hep

## 2019-02-09 NOTE — CARE PLAN
Problem: Safety  Goal: Will remain free from injury  Outcome: PROGRESSING AS EXPECTED  Patient using call light appropriately.  socks in place. Bed alarm on. Staff assisting patient with mobility.

## 2019-02-09 NOTE — PROGRESS NOTES
Patient is alert and oriented.  Rested well last night with CPAP in place.  Denies pain or discomfort.  No s/s of distress.

## 2019-02-10 VITALS
BODY MASS INDEX: 32.14 KG/M2 | RESPIRATION RATE: 18 BRPM | HEART RATE: 87 BPM | WEIGHT: 242.51 LBS | TEMPERATURE: 97.8 F | SYSTOLIC BLOOD PRESSURE: 122 MMHG | HEIGHT: 73 IN | OXYGEN SATURATION: 95 % | DIASTOLIC BLOOD PRESSURE: 64 MMHG

## 2019-02-10 LAB
ALBUMIN SERPL BCP-MCNC: 3.5 G/DL (ref 3.2–4.9)
ALBUMIN/GLOB SERPL: 1.5 G/DL
ALP SERPL-CCNC: 43 U/L (ref 30–99)
ALT SERPL-CCNC: 11 U/L (ref 2–50)
ANION GAP SERPL CALC-SCNC: 9 MMOL/L (ref 0–11.9)
AST SERPL-CCNC: 9 U/L (ref 12–45)
BILIRUB SERPL-MCNC: 0.6 MG/DL (ref 0.1–1.5)
BUN SERPL-MCNC: 105 MG/DL (ref 8–22)
CALCIUM SERPL-MCNC: 7.5 MG/DL (ref 8.5–10.5)
CHLORIDE SERPL-SCNC: 105 MMOL/L (ref 96–112)
CO2 SERPL-SCNC: 17 MMOL/L (ref 20–33)
CREAT SERPL-MCNC: 4.76 MG/DL (ref 0.5–1.4)
GLOBULIN SER CALC-MCNC: 2.4 G/DL (ref 1.9–3.5)
GLUCOSE SERPL-MCNC: 104 MG/DL (ref 65–99)
POTASSIUM SERPL-SCNC: 4.3 MMOL/L (ref 3.6–5.5)
PROT SERPL-MCNC: 5.9 G/DL (ref 6–8.2)
SODIUM SERPL-SCNC: 131 MMOL/L (ref 135–145)

## 2019-02-10 PROCEDURE — 99239 HOSP IP/OBS DSCHRG MGMT >30: CPT | Performed by: HOSPITALIST

## 2019-02-10 PROCEDURE — A9270 NON-COVERED ITEM OR SERVICE: HCPCS | Performed by: NURSE PRACTITIONER

## 2019-02-10 PROCEDURE — 700102 HCHG RX REV CODE 250 W/ 637 OVERRIDE(OP): Performed by: NURSE PRACTITIONER

## 2019-02-10 PROCEDURE — 36415 COLL VENOUS BLD VENIPUNCTURE: CPT

## 2019-02-10 PROCEDURE — 94660 CPAP INITIATION&MGMT: CPT

## 2019-02-10 PROCEDURE — A9270 NON-COVERED ITEM OR SERVICE: HCPCS | Performed by: HOSPITALIST

## 2019-02-10 PROCEDURE — 80053 COMPREHEN METABOLIC PANEL: CPT

## 2019-02-10 PROCEDURE — 700102 HCHG RX REV CODE 250 W/ 637 OVERRIDE(OP): Performed by: HOSPITALIST

## 2019-02-10 RX ADMIN — AMLODIPINE BESYLATE 5 MG: 5 TABLET ORAL at 05:20

## 2019-02-10 RX ADMIN — ASPIRIN 81 MG: 81 TABLET, COATED ORAL at 06:00

## 2019-02-10 RX ADMIN — SUCRALFATE 1 G: 1 TABLET ORAL at 08:16

## 2019-02-10 RX ADMIN — OMEPRAZOLE 20 MG: 20 CAPSULE, DELAYED RELEASE ORAL at 06:00

## 2019-02-10 RX ADMIN — BUPROPION HYDROCHLORIDE 75 MG: 75 TABLET, FILM COATED ORAL at 05:19

## 2019-02-10 RX ADMIN — CITALOPRAM HYDROBROMIDE 40 MG: 20 TABLET ORAL at 05:20

## 2019-02-10 NOTE — CARE PLAN
Problem: Infection  Goal: Will remain free from infection  Outcome: PROGRESSING AS EXPECTED      Problem: Respiratory:  Goal: Respiratory status will improve  Outcome: MET Date Met: 02/09/19      Problem: Fluid Volume:  Goal: Will maintain balanced intake and output  Outcome: PROGRESSING AS EXPECTED

## 2019-02-10 NOTE — DISCHARGE INSTRUCTIONS
Discharge Instructions    Discharged to home by car with relative. Discharged via wheelchair, hospital escort: Refused.  Special equipment needed: Not Applicable    Be sure to schedule a follow-up appointment with your primary care doctor or any specialists as instructed.     Discharge Plan:   Influenza Vaccine Indication: Not indicated: Previously immunized this influenza season and > 8 years of age    I understand that a diet low in cholesterol, fat, and sodium is recommended for good health. Unless I have been given specific instructions below for another diet, I accept this instruction as my diet prescription.   Other diet: Renal Diet    Special Instructions: None    · Is patient discharged on Warfarin / Coumadin?   No     Depression / Suicide Risk    As you are discharged from this [facility] facility, it is important to learn how to keep safe from harming yourself.    Recognize the warning signs:  · Abrupt changes in personality, positive or negative- including increase in energy   · Giving away possessions  · Change in eating patterns- significant weight changes-  positive or negative  · Change in sleeping patterns- unable to sleep or sleeping all the time   · Unwillingness or inability to communicate  · Depression  · Unusual sadness, discouragement and loneliness  · Talk of wanting to die  · Neglect of personal appearance   · Rebelliousness- reckless behavior  · Withdrawal from people/activities they love  · Confusion- inability to concentrate     If you or a loved one observes any of these behaviors or has concerns about self-harm, here's what you can do:  · Talk about it- your feelings and reasons for harming yourself  · Remove any means that you might use to hurt yourself (examples: pills, rope, extension cords, firearm)  · Get professional help from the community (Mental Health, Substance Abuse, psychological counseling)  · Do not be alone:Call your Safe Contact- someone whom you trust who will be  there for you.  · Call your local CRISIS HOTLINE 085-3077 or 776-388-5279  · Call your local Children's Mobile Crisis Response Team [place] (098) 533-7157 or www.[employer]  · Call the toll free National Suicide Prevention Hotlines   · National Suicide Prevention Lifeline 606-561-DPOB (9967)  · National [employer] Network 800-SUICIDE (031-3958)

## 2019-02-10 NOTE — DISCHARGE SUMMARY
Discharge Summary    CHIEF COMPLAINT ON ADMISSION  No chief complaint on file.      Reason for Admission  Acute Kidney Injury     Admission Date  2/8/2019    CODE STATUS  Full Code    HPI & HOSPITAL COURSE  This is a 68 y.o. male here with pmh of CKD secondary to overuse of NSAIDS, HTN, depression, S/P ileostomy comes to the ED with c/o nausea, vomiting, fever and diarrhea. Pt states that he was feeling orthostatic. Upon arrival to the ED he was hypoteinsive and tachycardic. He was found to be in severe kidney faliure. Creatinine 11 and , Na 126. He was started on IV fluids. Diarrhea, emesis and fever had improved. After blood and urine studies it was determined that his hyponatremia was secondary to hypovolemia. Nephrology was consulted and determined that his IVONNE was secondary to prerenal and would not require dialysis. After IV hydration his kidney function improved.  He is to be discharged home. Follow up with a BMP in a week.          Therefore, he is discharged in good and stable condition to home with close outpatient follow-up.    The patient met 2-midnight criteria for an inpatient stay at the time of discharge.    Discharge Date  2/10/2019    FOLLOW UP ITEMS POST DISCHARGE  Follow up with BMP in a week  Follow up with a PCP  Nephrology follow up   Avoid nephrotoxic agents    DISCHARGE DIAGNOSES  Principal Problem:    Acute on chronic kidney failure (HCC) POA: Unknown  Active Problems:    Diarrhea POA: Yes    Ileostomy in place ([facility]) POA: Unknown    Essential hypertension POA: Unknown    Metabolic acidosis POA: Unknown    Class 1 obesity due to excess calories without serious comorbidity with body mass index (BMI) of 32.0 to 32.9 in adult POA: Unknown    Hypocalcemia POA: Unknown  Resolved Problems:    * No resolved hospital problems. *      FOLLOW UP  No future appointments.  No follow-up provider specified.    MEDICATIONS ON DISCHARGE     Medication List      CONTINUE taking these medications       Instructions   amLODIPine 5 MG Tabs  Commonly known as:  NORVASC   Take 5 mg by mouth every day.  Dose:  5 mg     buPROPion 75 MG Tabs  Commonly known as:  WELLBUTRIN   Take 150 mg by mouth every day.  Dose:  150 mg     citalopram 40 MG Tabs  Commonly known as:  CELEXA   Take 40 mg by mouth every day.  Dose:  40 mg     lisinopril 40 MG tablet  Commonly known as:  PRINIVIL, ZESTRIL   Take 40 mg by mouth every day.  Dose:  40 mg            Allergies  Allergies   Allergen Reactions   • Shellfish Allergy Anaphylaxis   • Levaquin Unspecified     Achilles tendon gets sore       DIET  Orders Placed This Encounter   Procedures   • Diet Order Renal     Standing Status:   Standing     Number of Occurrences:   1     Order Specific Question:   Diet:     Answer:   Renal [8]       ACTIVITY  As tolerated.  Weight bearing as tolerated    CONSULTATIONS  Nephrology      LABORATORY  Lab Results   Component Value Date    SODIUM 131 (L) 02/10/2019    POTASSIUM 4.3 02/10/2019    CHLORIDE 105 02/10/2019    CO2 17 (L) 02/10/2019    GLUCOSE 104 (H) 02/10/2019     (HH) 02/10/2019    CREATININE 4.76 (H) 02/10/2019        Lab Results   Component Value Date    WBC 12.2 (H) 02/09/2019    HEMOGLOBIN 16.6 02/09/2019    HEMATOCRIT 47.6 02/09/2019    PLATELETCT 235 02/09/2019        Total time of the discharge process exceeds 40 minutes.

## 2019-02-10 NOTE — CARE PLAN
Problem: Safety  Goal: Will remain free from injury  Outcome: PROGRESSING SLOWER THAN EXPECTED  Call light usage reinforced. Pt. Getting up to bathroom with stand by assistance    Problem: Knowledge Deficit  Goal: Knowledge of disease process/condition, treatment plan, diagnostic tests, and medications will improve  Outcome: PROGRESSING SLOWER THAN EXPECTED  Pt. Instructed on medication administration and treatment plan.

## 2023-09-14 NOTE — ASSESSMENT & PLAN NOTE
Unclear chronicity.  Patient reports that he has seen a specialist for the same.  Suspect at least component of pre-renal disease given recent gastrointestinal illness and massive ileostomy output.  Continue hydration, check urine studies and repeat CMP   No

## (undated) DEVICE — DRAPE COLUMN  BOX OF 20

## (undated) DEVICE — STAPLER 75MM LINEAR OPEN (3EA/BX)

## (undated) DEVICE — KIT ROOM DECONTAMINATION

## (undated) DEVICE — TUBE LUKI TRAP STERILE DISP. - 8CC (12/BX 4BX/CA)

## (undated) DEVICE — GOWN SURGEONS X-LARGE - DISP. (30/CA)

## (undated) DEVICE — DRAPE MAYO STAND - (30/CA)

## (undated) DEVICE — GLOVE BIOGEL INDICATOR SZ 7.5 SURGICAL PF LTX - (50PR/BX 4BX/CA)

## (undated) DEVICE — REDUCER XI STAPLER 12MM TO 8MM (6EA/BX)

## (undated) DEVICE — HEMOSTAT SURG ABSORBABLE - 2 X 3 IN SURGICEL (24EA/CA)

## (undated) DEVICE — BLADE SURGICAL CLIPPER - (50EA/CA)

## (undated) DEVICE — SHEARS MONOPOLAR CURVED  DA VINCI 10X'S REUSABLE

## (undated) DEVICE — SET EXTENSION WITH 2 PORTS (48EA/CA) ***PART #2C8610 IS A SUBSTITUTE*****

## (undated) DEVICE — BOVIE  BLADE 6 EXTENDED - (50/PK)

## (undated) DEVICE — SEAL 5MM-8MM UNIVERSAL  BOX OF 10

## (undated) DEVICE — HEMOSTAT ARISTA PWD 5 GRAM - (5/BX)

## (undated) DEVICE — SHEATH STAPLER 45 DAVINCI (10EA/BX)

## (undated) DEVICE — TUBE E-T HI-LO CUFF 7.5MM (10EA/PK)

## (undated) DEVICE — DRAIN SILICONE CLOSED WOUND SUCTION CHANNEL 24FR ROUND HUBLESS (10/CA)

## (undated) DEVICE — RESERVOIR SUCTION 100 CC - SILICONE (20EA/CA)

## (undated) DEVICE — Device

## (undated) DEVICE — ELECTRODE 850 FOAM ADHESIVE - HYDROGEL RADIOTRNSPRNT (50/PK)

## (undated) DEVICE — GOWN SURGEONS LARGE - (32/CA)

## (undated) DEVICE — NEPTUNE 4 PORT MANIFOLD - (20/PK)

## (undated) DEVICE — MASK ANESTHESIA ADULT  - (100/CA)

## (undated) DEVICE — RELOAD STAPLER WHITE ENDOWRIST 45 (12EA/BX)

## (undated) DEVICE — GRAFT MESH SEPRAFILM PRO PACK - 5/BX CONTAINS 6 3X5 PIECES

## (undated) DEVICE — STAPLER 60MM OPEN GREEN 4.8 - W/STAPLE (3/BX)

## (undated) DEVICE — SEALER VESSEL HARMONIC ACE PLUS WITH ADVANCED HEMOSTASIS 36CM (1/EA)

## (undated) DEVICE — SUTURE 0 LIGATING REEL VICRYL PLUS (12PK/BX)

## (undated) DEVICE — TROCAR 5X100 NON BLADED Z-TH - READ KII (6/BX)

## (undated) DEVICE — SEALER VESSEL DA VINCI XI (6EA/CA)

## (undated) DEVICE — SUTURE 1 PDS-2 PLUS CTX - (24/BX)

## (undated) DEVICE — FIBRILLAR SURGICEL 4X4 - 10/CA

## (undated) DEVICE — SEAL CANNULA STAPLER 12 MM (10EA/BX)

## (undated) DEVICE — SPONGE DRAIN 4 X 4IN 6-PLY - (2/PK25PK/BX12BX/CS)

## (undated) DEVICE — ELECTRODE DUAL RETURN W/ CORD - (50/PK)

## (undated) DEVICE — SUTURE 2-0 PROLENE CT-1 30 (36PK/BX)"

## (undated) DEVICE — SENSOR SPO2 NEO LNCS ADHESIVE (20/BX) SEE USER NOTES

## (undated) DEVICE — SUCTION INSTRUMENT YANKAUER BULBOUS TIP W/O VENT (50EA/CA)

## (undated) DEVICE — PAD OR TABLE DA VINCI 2IN X 20IN X 72IN - (12EA/CA)

## (undated) DEVICE — SET LEADWIRE 5 LEAD BEDSIDE DISPOSABLE ECG (1SET OF 5/EA)

## (undated) DEVICE — CANISTER SUCTION 3000ML MECHANICAL FILTER AUTO SHUTOFF MEDI-VAC NONSTERILE LF DISP  (40EA/CA)

## (undated) DEVICE — WATER IRRIG. STER. 1500 ML - (9/CA)

## (undated) DEVICE — GLOVE BIOGEL SZ 7.5 SURGICAL PF LTX - (50PR/BX 4BX/CA)

## (undated) DEVICE — TUBING CLEARLINK DUO-VENT - C-FLO (48EA/CA)

## (undated) DEVICE — COVER TIP ENDOWRIST HOT SHEAR - (10EA/BX) DA VINCI

## (undated) DEVICE — STAPLER 29MM CIRCULAR CURVED - (3EA/BX)

## (undated) DEVICE — PROTECTOR ULNA NERVE - (36PR/CA)

## (undated) DEVICE — GELPORT 120MM - (1/EA)

## (undated) DEVICE — GOWN WARMING STANDARD FLEX - (30/CA)

## (undated) DEVICE — SUTURE 1 PDS PLUS CT-1 36IN (36EA/BX)

## (undated) DEVICE — RELOAD STAPLER GREEN ENDOWRIST 45 (12EA/BX)

## (undated) DEVICE — KIT ANESTHESIA W/CIRCUIT & 3/LT BAG W/FILTER (20EA/CA)

## (undated) DEVICE — PAD LAP STERILE 18 X 18 - (5/PK 40PK/CA)

## (undated) DEVICE — SLEEVE, VASO, THIGH, MED

## (undated) DEVICE — SET SUCTION/IRRIGATION WITH DISPOSABLE TIP (6/CA )PART #0250-070-520 IS A SUB

## (undated) DEVICE — TRAY CATHETER FOLEY URINE METER W/STATLOCK 350ML (10EA/CA)

## (undated) DEVICE — GLOVE BIOGEL SZ 8 SURGICAL PF LTX - (50PR/BX 4BX/CA)

## (undated) DEVICE — CHLORAPREP 26 ML APPLICATOR - ORANGE TINT(25/CA)

## (undated) DEVICE — GLOVE BIOGEL SZ 6.5 SURGICAL PF LTX (50PR/BX 4BX/CA)

## (undated) DEVICE — GLOVE BIOGEL PI INDICATOR SZ 6.5 SURGICAL PF LF - (50/BX 4BX/CA)

## (undated) DEVICE — TOWELS CLOTH SURGICAL - (4/PK 20PK/CA)

## (undated) DEVICE — SUTURE 3-0 VICRYL PLUS SH - 27 INCH (36/BX)

## (undated) DEVICE — SUTURE 3-0 VICRYL PLUS SH - 8X 18 INCH (12/BX)

## (undated) DEVICE — SUTURE NABSB 2-0 KS 30IN PRLN BLUE (36PK/BX)

## (undated) DEVICE — TUBE CONNECT SUCTION CLEAR 120 X 1/4" (50EA/CA)"

## (undated) DEVICE — SODIUM CHL IRRIGATION 0.9% 1000ML (12EA/CA)

## (undated) DEVICE — HEAD HOLDER JUNIOR/ADULT

## (undated) DEVICE — KIT 2.25IN COL ILSTM 2 PC DRN - 57MM 2 1/4 INCH THIS IS FOR THE OR ONLY (5/BX)

## (undated) DEVICE — SUTURE 2-0 VICRYL PLUS TP-1 - (24/BX)

## (undated) DEVICE — FORCEPS FENESTRATED BIPOLAR DA VINCI 10X'S REUSABLE

## (undated) DEVICE — LACTATED RINGERS INJ 1000 ML - (14EA/CA 60CA/PF)

## (undated) DEVICE — STAPLER 30MM OPEN GREEN 4.8MM W/STAPLE (3EA/BX)

## (undated) DEVICE — SUTURE GENERAL

## (undated) DEVICE — PACK DAVINCI LOW ANTERIOR RESECTION (2EA/CA)

## (undated) DEVICE — OBTURATOR BLADELESS STANDARD 8MM (6EA/BX)

## (undated) DEVICE — SUTURE 4-0 MONOCRYL PLUS PS-2 - 27 INCH (36/BX)

## (undated) DEVICE — DRAPE ARM  BOX OF 20

## (undated) DEVICE — STAPLE 75MM LINEAR (12EA/BX)

## (undated) DEVICE — DERMABOND ADVANCED - (12EA/BX)